# Patient Record
Sex: MALE | Race: OTHER | NOT HISPANIC OR LATINO | URBAN - METROPOLITAN AREA
[De-identification: names, ages, dates, MRNs, and addresses within clinical notes are randomized per-mention and may not be internally consistent; named-entity substitution may affect disease eponyms.]

---

## 2017-06-17 ENCOUNTER — INPATIENT (INPATIENT)
Facility: HOSPITAL | Age: 57
LOS: 1 days | Discharge: HOME | DRG: 203 | End: 2017-06-19
Attending: FAMILY MEDICINE | Admitting: HOSPITALIST
Payer: MEDICARE

## 2017-06-17 VITALS
RESPIRATION RATE: 14 BRPM | HEART RATE: 106 BPM | OXYGEN SATURATION: 95 % | HEIGHT: 66 IN | WEIGHT: 169.98 LBS | SYSTOLIC BLOOD PRESSURE: 135 MMHG | TEMPERATURE: 100 F | DIASTOLIC BLOOD PRESSURE: 81 MMHG

## 2017-06-17 DIAGNOSIS — Z90.6 ACQUIRED ABSENCE OF OTHER PARTS OF URINARY TRACT: Chronic | ICD-10-CM

## 2017-06-17 DIAGNOSIS — Z90.5 ACQUIRED ABSENCE OF KIDNEY: Chronic | ICD-10-CM

## 2017-06-17 DIAGNOSIS — Z90.49 ACQUIRED ABSENCE OF OTHER SPECIFIED PARTS OF DIGESTIVE TRACT: Chronic | ICD-10-CM

## 2017-06-17 LAB
BASOPHILS # BLD AUTO: 0.1 K/UL — SIGNIFICANT CHANGE UP (ref 0–0.2)
BASOPHILS NFR BLD AUTO: 1 % — SIGNIFICANT CHANGE UP (ref 0–2)
EOSINOPHIL # BLD AUTO: 0.2 K/UL — SIGNIFICANT CHANGE UP (ref 0–0.5)
EOSINOPHIL NFR BLD AUTO: 2.7 % — SIGNIFICANT CHANGE UP (ref 0–6)
HCT VFR BLD CALC: 42.6 % — SIGNIFICANT CHANGE UP (ref 39–50)
HGB BLD-MCNC: 15.5 G/DL — SIGNIFICANT CHANGE UP (ref 13–17)
INR BLD: 1.13 RATIO — SIGNIFICANT CHANGE UP (ref 0.88–1.16)
LACTATE SERPL-SCNC: 1.3 MMOL/L — SIGNIFICANT CHANGE UP (ref 0.7–2)
LYMPHOCYTES # BLD AUTO: 2.4 K/UL — SIGNIFICANT CHANGE UP (ref 1–3.3)
LYMPHOCYTES # BLD AUTO: 29.8 % — SIGNIFICANT CHANGE UP (ref 13–44)
MCHC RBC-ENTMCNC: 36.5 GM/DL — HIGH (ref 32–36)
MCHC RBC-ENTMCNC: 37.4 PG — HIGH (ref 27–34)
MCV RBC AUTO: 102.5 FL — HIGH (ref 80–100)
MONOCYTES # BLD AUTO: 1.1 K/UL — HIGH (ref 0–0.9)
MONOCYTES NFR BLD AUTO: 14 % — HIGH (ref 1–9)
NEUTROPHILS # BLD AUTO: 4.2 K/UL — SIGNIFICANT CHANGE UP (ref 1.8–7.4)
NEUTROPHILS NFR BLD AUTO: 52.4 % — SIGNIFICANT CHANGE UP (ref 43–77)
PLATELET # BLD AUTO: 171 K/UL — SIGNIFICANT CHANGE UP (ref 150–400)
PROTHROM AB SERPL-ACNC: 12.3 SEC — SIGNIFICANT CHANGE UP (ref 9.8–12.7)
RBC # BLD: 4.15 M/UL — LOW (ref 4.2–5.8)
RBC # FLD: 12.4 % — SIGNIFICANT CHANGE UP (ref 10.3–14.5)
WBC # BLD: 8.1 K/UL — SIGNIFICANT CHANGE UP (ref 3.8–10.5)
WBC # FLD AUTO: 8.1 K/UL — SIGNIFICANT CHANGE UP (ref 3.8–10.5)

## 2017-06-17 PROCEDURE — 71020: CPT | Mod: 26

## 2017-06-17 RX ORDER — ACETAMINOPHEN 500 MG
650 TABLET ORAL ONCE
Qty: 0 | Refills: 0 | Status: COMPLETED | OUTPATIENT
Start: 2017-06-17 | End: 2017-06-17

## 2017-06-17 RX ORDER — PIPERACILLIN AND TAZOBACTAM 4; .5 G/20ML; G/20ML
3.38 INJECTION, POWDER, LYOPHILIZED, FOR SOLUTION INTRAVENOUS ONCE
Qty: 0 | Refills: 0 | Status: COMPLETED | OUTPATIENT
Start: 2017-06-17 | End: 2017-06-17

## 2017-06-17 RX ORDER — IPRATROPIUM/ALBUTEROL SULFATE 18-103MCG
3 AEROSOL WITH ADAPTER (GRAM) INHALATION ONCE
Qty: 0 | Refills: 0 | Status: COMPLETED | OUTPATIENT
Start: 2017-06-17 | End: 2017-06-17

## 2017-06-17 RX ORDER — SODIUM CHLORIDE 9 MG/ML
1000 INJECTION INTRAMUSCULAR; INTRAVENOUS; SUBCUTANEOUS
Qty: 0 | Refills: 0 | Status: COMPLETED | OUTPATIENT
Start: 2017-06-17 | End: 2017-06-18

## 2017-06-17 RX ADMIN — SODIUM CHLORIDE 1000 MILLILITER(S): 9 INJECTION INTRAMUSCULAR; INTRAVENOUS; SUBCUTANEOUS at 23:15

## 2017-06-17 RX ADMIN — Medication 650 MILLIGRAM(S): at 23:25

## 2017-06-17 RX ADMIN — Medication 3 MILLILITER(S): at 23:20

## 2017-06-17 RX ADMIN — PIPERACILLIN AND TAZOBACTAM 200 GRAM(S): 4; .5 INJECTION, POWDER, LYOPHILIZED, FOR SOLUTION INTRAVENOUS at 23:15

## 2017-06-17 NOTE — CONSULT NOTE ADULT - SUBJECTIVE AND OBJECTIVE BOX
Patient seen and examined today Plan discussed/Questions answered  (with patient/ancillary staff/colleagues) Chart notes reviewd More detailed Pulm/Critical Care notes, assessment and plan  will be added later today as needed after reviewing further labs as they become available     Notable interval events reviewed    ROS/PE  . REVIEW OF SYMPTOMS    Able to give ROS  Yes   NO   RELIABLE YES NO   Weakness Yes   Chills No   Vision changes No  Lymph nodes No enlarged glands   Endocrine No unexplained hair loss No het or cold intolerance   Allergy No hives   Sore throat No   Coughing blood No  Headache No  Confusion YES  Chest pain No  Palpitations No   Pain abdomen NO   Shortness of breath YES  Vomiting NO  Pain neck No  Pain abdomen NO     PHYSICAL EXAM    HEENT Unremarkable PERRLA atraumatic  RESP Fair air entry EXP prolonged    Harsh breath sound Resp distres mild  CARDIAC S1 S2 No S3     NO JVD   Awake Alert and ORIENTED x THREE  ABDOMEN SOFT BS PRESENT NOT DISTENDED No hepatosplenomegaly  PEDAL EDEMA present No calf tenderness  NO rash GENERAL Not TOXIC looking  . Patient seen and examined today Plan discussed/Questions answered  (with patient/ancillary staff/colleagues) Chart notes reviewd More detailed Pulm/Critical Care notes, assessment and plan  will be added later today as needed after reviewing further labs as they become available     Notable interval events reviewed    ROS/PE  . REVIEW OF SYMPTOMS    Able to give ROS  Yes     RELIABLE YES   Weakness Yes   Chills No   Vision changes No  Lymph nodes No enlarged glands   Endocrine No unexplained hair loss No het or cold intolerance   Allergy No hives   Sore throat No   Coughing blood No  Headache No  Confusion YES  Chest pain No  Palpitations No   Pain abdomen NO   Shortness of breath YES  Vomiting NO  Pain neck No  Pain abdomen NO     PHYSICAL EXAM    HEENT Unremarkable PERRLA atraumatic  RESP Fair air entry EXP prolonged    Harsh breath sound Resp distres mild  CARDIAC S1 S2 No S3     NO JVD   Awake Alert and ORIENTED x THREE  ABDOMEN SOFT BS PRESENT NOT DISTENDED No hepatosplenomegaly  PEDAL EDEMA present No calf tenderness  NO rash GENERAL Not TOXIC looking    PATIENT DESCRIPTION  56 [place] in [place] 36 y HO exposure to TB as per dtr originally from [place] HIV positive since 2000 on Kaletra Combivir last CD4 1060 as per patient has never had bronch HO multiple serial cancers L kidney ca [place] [facility] [place] nephrectomy done 2011 Bladder CA 5/2015 sp ureteroileostomy 2015 rectal ca sp surgery [facility] 2016 with postsurgical complication of compartment syndrome l leg urgen surgery done same day as colon surgery  disabled  quit working 2012 admitted [facility] 6/17/2017 because of cough and dyspnea x 2 d pta No recent travel No sick contact  He went to  and was started on levaquin which has not helped him and he came to [facility] and was admitted and Pulm consulted   6/15/2017 W 8.1 Hb 15./5 Plt 171k  LA 1.3   RESP TRACT INFECTION IN HIV PATIENT   6/17/2017 PCP is a possibility (can present with normal CXR) but will dw ID before starting bactrim keeping in mind he has a single kidney which is cancerous  Agree with zosyn for now basic pneumonia moe including Strep pneum lgionella ordered   HIV   6/17 Suggest ID eval for ART   RESP   Monitor po   HO URETEROILEOSTOMY   6/17/2017 RO UTI   .

## 2017-06-17 NOTE — ED ADULT NURSE NOTE - PSH
Bladder absent  removed secondary to cancer  History of colon resection    History of nephrectomy  left

## 2017-06-17 NOTE — ED ADULT TRIAGE NOTE - CHIEF COMPLAINT QUOTE
"He has a fever and he is a cancer patient."  pt went to urgent care was diagnosed with a UTI and a cold, pt is not currently on chemotherapy, temp 99.7 in triage

## 2017-06-18 DIAGNOSIS — J18.9 PNEUMONIA, UNSPECIFIED ORGANISM: ICD-10-CM

## 2017-06-18 DIAGNOSIS — S68.119A COMPLETE TRAUMATIC METACARPOPHALANGEAL AMPUTATION OF UNSPECIFIED FINGER, INITIAL ENCOUNTER: Chronic | ICD-10-CM

## 2017-06-18 DIAGNOSIS — C64.9 MALIGNANT NEOPLASM OF UNSPECIFIED KIDNEY, EXCEPT RENAL PELVIS: ICD-10-CM

## 2017-06-18 DIAGNOSIS — Z21 ASYMPTOMATIC HUMAN IMMUNODEFICIENCY VIRUS [HIV] INFECTION STATUS: ICD-10-CM

## 2017-06-18 DIAGNOSIS — Z98.890 OTHER SPECIFIED POSTPROCEDURAL STATES: Chronic | ICD-10-CM

## 2017-06-18 DIAGNOSIS — Z29.9 ENCOUNTER FOR PROPHYLACTIC MEASURES, UNSPECIFIED: ICD-10-CM

## 2017-06-18 DIAGNOSIS — E78.00 PURE HYPERCHOLESTEROLEMIA, UNSPECIFIED: ICD-10-CM

## 2017-06-18 DIAGNOSIS — Z72.0 TOBACCO USE: ICD-10-CM

## 2017-06-18 DIAGNOSIS — J40 BRONCHITIS, NOT SPECIFIED AS ACUTE OR CHRONIC: ICD-10-CM

## 2017-06-18 DIAGNOSIS — E11.9 TYPE 2 DIABETES MELLITUS WITHOUT COMPLICATIONS: ICD-10-CM

## 2017-06-18 LAB
ALBUMIN SERPL ELPH-MCNC: 3.4 G/DL — SIGNIFICANT CHANGE UP (ref 3.3–5)
ALBUMIN SERPL ELPH-MCNC: 3.6 G/DL — SIGNIFICANT CHANGE UP (ref 3.3–5)
ALP SERPL-CCNC: 107 U/L — SIGNIFICANT CHANGE UP (ref 40–120)
ALP SERPL-CCNC: 114 U/L — SIGNIFICANT CHANGE UP (ref 40–120)
ALT FLD-CCNC: 11 U/L — LOW (ref 12–78)
ALT FLD-CCNC: 11 U/L — LOW (ref 12–78)
ANION GAP SERPL CALC-SCNC: 6 MMOL/L — SIGNIFICANT CHANGE UP (ref 5–17)
ANION GAP SERPL CALC-SCNC: 8 MMOL/L — SIGNIFICANT CHANGE UP (ref 5–17)
APPEARANCE UR: CLEAR — SIGNIFICANT CHANGE UP
AST SERPL-CCNC: 13 U/L — LOW (ref 15–37)
AST SERPL-CCNC: 15 U/L — SIGNIFICANT CHANGE UP (ref 15–37)
BACTERIA # UR AUTO: ABNORMAL
BASOPHILS # BLD AUTO: 0.1 K/UL — SIGNIFICANT CHANGE UP (ref 0–0.2)
BASOPHILS NFR BLD AUTO: 1.2 % — SIGNIFICANT CHANGE UP (ref 0–2)
BILIRUB SERPL-MCNC: 0.7 MG/DL — SIGNIFICANT CHANGE UP (ref 0.2–1.2)
BILIRUB SERPL-MCNC: 0.7 MG/DL — SIGNIFICANT CHANGE UP (ref 0.2–1.2)
BILIRUB UR-MCNC: NEGATIVE — SIGNIFICANT CHANGE UP
BUN SERPL-MCNC: 14 MG/DL — SIGNIFICANT CHANGE UP (ref 7–23)
BUN SERPL-MCNC: 17 MG/DL — SIGNIFICANT CHANGE UP (ref 7–23)
CALCIUM SERPL-MCNC: 9 MG/DL — SIGNIFICANT CHANGE UP (ref 8.5–10.1)
CALCIUM SERPL-MCNC: 9.3 MG/DL — SIGNIFICANT CHANGE UP (ref 8.5–10.1)
CHLORIDE SERPL-SCNC: 103 MMOL/L — SIGNIFICANT CHANGE UP (ref 96–108)
CHLORIDE SERPL-SCNC: 106 MMOL/L — SIGNIFICANT CHANGE UP (ref 96–108)
CO2 SERPL-SCNC: 25 MMOL/L — SIGNIFICANT CHANGE UP (ref 22–31)
CO2 SERPL-SCNC: 25 MMOL/L — SIGNIFICANT CHANGE UP (ref 22–31)
COLOR SPEC: YELLOW — SIGNIFICANT CHANGE UP
CREAT SERPL-MCNC: 1.1 MG/DL — SIGNIFICANT CHANGE UP (ref 0.5–1.3)
CREAT SERPL-MCNC: 1.2 MG/DL — SIGNIFICANT CHANGE UP (ref 0.5–1.3)
DIFF PNL FLD: ABNORMAL
EOSINOPHIL # BLD AUTO: 0.2 K/UL — SIGNIFICANT CHANGE UP (ref 0–0.5)
EOSINOPHIL NFR BLD AUTO: 3.3 % — SIGNIFICANT CHANGE UP (ref 0–6)
GLUCOSE SERPL-MCNC: 157 MG/DL — HIGH (ref 70–99)
GLUCOSE SERPL-MCNC: 209 MG/DL — HIGH (ref 70–99)
GLUCOSE UR QL: NEGATIVE — SIGNIFICANT CHANGE UP
GRAM STN FLD: SIGNIFICANT CHANGE UP
HBA1C BLD-MCNC: 6 % — HIGH (ref 4–5.6)
HCT VFR BLD CALC: 42.3 % — SIGNIFICANT CHANGE UP (ref 39–50)
HGB BLD-MCNC: 15 G/DL — SIGNIFICANT CHANGE UP (ref 13–17)
KETONES UR-MCNC: NEGATIVE — SIGNIFICANT CHANGE UP
LACTATE SERPL-SCNC: 1.3 MMOL/L — SIGNIFICANT CHANGE UP (ref 0.7–2)
LEUKOCYTE ESTERASE UR-ACNC: NEGATIVE — SIGNIFICANT CHANGE UP
LYMPHOCYTES # BLD AUTO: 1.7 K/UL — SIGNIFICANT CHANGE UP (ref 1–3.3)
LYMPHOCYTES # BLD AUTO: 26.7 % — SIGNIFICANT CHANGE UP (ref 13–44)
MCHC RBC-ENTMCNC: 35.4 GM/DL — SIGNIFICANT CHANGE UP (ref 32–36)
MCHC RBC-ENTMCNC: 37 PG — HIGH (ref 27–34)
MCV RBC AUTO: 104.3 FL — HIGH (ref 80–100)
MONOCYTES # BLD AUTO: 0.9 K/UL — SIGNIFICANT CHANGE UP (ref 0–0.9)
MONOCYTES NFR BLD AUTO: 14.8 % — HIGH (ref 1–9)
NEUTROPHILS # BLD AUTO: 3.4 K/UL — SIGNIFICANT CHANGE UP (ref 1.8–7.4)
NEUTROPHILS NFR BLD AUTO: 54.1 % — SIGNIFICANT CHANGE UP (ref 43–77)
NITRITE UR-MCNC: NEGATIVE — SIGNIFICANT CHANGE UP
PH UR: 7 — SIGNIFICANT CHANGE UP (ref 5–8)
PLATELET # BLD AUTO: 159 K/UL — SIGNIFICANT CHANGE UP (ref 150–400)
POTASSIUM SERPL-MCNC: 3.4 MMOL/L — LOW (ref 3.5–5.3)
POTASSIUM SERPL-MCNC: 3.8 MMOL/L — SIGNIFICANT CHANGE UP (ref 3.5–5.3)
POTASSIUM SERPL-SCNC: 3.4 MMOL/L — LOW (ref 3.5–5.3)
POTASSIUM SERPL-SCNC: 3.8 MMOL/L — SIGNIFICANT CHANGE UP (ref 3.5–5.3)
PROCALCITONIN SERPL-MCNC: 0.07 NG/ML — HIGH (ref 0–0.04)
PROT SERPL-MCNC: 6.9 G/DL — SIGNIFICANT CHANGE UP (ref 6–8.3)
PROT SERPL-MCNC: 7.6 G/DL — SIGNIFICANT CHANGE UP (ref 6–8.3)
PROT UR-MCNC: 25 MG/DL
RAPID RVP RESULT: SIGNIFICANT CHANGE UP
RBC # BLD: 4.05 M/UL — LOW (ref 4.2–5.8)
RBC # FLD: 12.3 % — SIGNIFICANT CHANGE UP (ref 10.3–14.5)
RBC CASTS # UR COMP ASSIST: ABNORMAL /HPF (ref 0–4)
SODIUM SERPL-SCNC: 136 MMOL/L — SIGNIFICANT CHANGE UP (ref 135–145)
SODIUM SERPL-SCNC: 137 MMOL/L — SIGNIFICANT CHANGE UP (ref 135–145)
SP GR SPEC: 1.01 — SIGNIFICANT CHANGE UP (ref 1.01–1.02)
SPECIMEN SOURCE: SIGNIFICANT CHANGE UP
UROBILINOGEN FLD QL: NEGATIVE — SIGNIFICANT CHANGE UP
WBC # BLD: 6.3 K/UL — SIGNIFICANT CHANGE UP (ref 3.8–10.5)
WBC # FLD AUTO: 6.3 K/UL — SIGNIFICANT CHANGE UP (ref 3.8–10.5)
WBC UR QL: ABNORMAL

## 2017-06-18 PROCEDURE — 99223 1ST HOSP IP/OBS HIGH 75: CPT | Mod: AI,GC

## 2017-06-18 PROCEDURE — 71250 CT THORAX DX C-: CPT | Mod: 26

## 2017-06-18 PROCEDURE — 93970 EXTREMITY STUDY: CPT | Mod: 26

## 2017-06-18 PROCEDURE — 99285 EMERGENCY DEPT VISIT HI MDM: CPT

## 2017-06-18 PROCEDURE — 12345: CPT | Mod: NC

## 2017-06-18 RX ORDER — GLUCAGON INJECTION, SOLUTION 0.5 MG/.1ML
1 INJECTION, SOLUTION SUBCUTANEOUS ONCE
Qty: 0 | Refills: 0 | Status: DISCONTINUED | OUTPATIENT
Start: 2017-06-18 | End: 2017-06-19

## 2017-06-18 RX ORDER — DEXTROSE 50 % IN WATER 50 %
12.5 SYRINGE (ML) INTRAVENOUS ONCE
Qty: 0 | Refills: 0 | Status: DISCONTINUED | OUTPATIENT
Start: 2017-06-18 | End: 2017-06-19

## 2017-06-18 RX ORDER — INSULIN LISPRO 100/ML
VIAL (ML) SUBCUTANEOUS
Qty: 0 | Refills: 0 | Status: DISCONTINUED | OUTPATIENT
Start: 2017-06-18 | End: 2017-06-19

## 2017-06-18 RX ORDER — DEXTROSE 50 % IN WATER 50 %
1 SYRINGE (ML) INTRAVENOUS ONCE
Qty: 0 | Refills: 0 | Status: DISCONTINUED | OUTPATIENT
Start: 2017-06-18 | End: 2017-06-19

## 2017-06-18 RX ORDER — DEXTROSE 50 % IN WATER 50 %
25 SYRINGE (ML) INTRAVENOUS ONCE
Qty: 0 | Refills: 0 | Status: DISCONTINUED | OUTPATIENT
Start: 2017-06-18 | End: 2017-06-19

## 2017-06-18 RX ORDER — PIPERACILLIN AND TAZOBACTAM 4; .5 G/20ML; G/20ML
3.38 INJECTION, POWDER, LYOPHILIZED, FOR SOLUTION INTRAVENOUS EVERY 8 HOURS
Qty: 0 | Refills: 0 | Status: DISCONTINUED | OUTPATIENT
Start: 2017-06-18 | End: 2017-06-19

## 2017-06-18 RX ORDER — NICOTINE POLACRILEX 2 MG
1 GUM BUCCAL DAILY
Qty: 0 | Refills: 0 | Status: DISCONTINUED | OUTPATIENT
Start: 2017-06-18 | End: 2017-06-19

## 2017-06-18 RX ORDER — ACETAMINOPHEN 500 MG
650 TABLET ORAL EVERY 6 HOURS
Qty: 0 | Refills: 0 | Status: DISCONTINUED | OUTPATIENT
Start: 2017-06-18 | End: 2017-06-19

## 2017-06-18 RX ORDER — ENOXAPARIN SODIUM 100 MG/ML
40 INJECTION SUBCUTANEOUS EVERY 24 HOURS
Qty: 0 | Refills: 0 | Status: DISCONTINUED | OUTPATIENT
Start: 2017-06-18 | End: 2017-06-19

## 2017-06-18 RX ORDER — SODIUM CHLORIDE 9 MG/ML
1000 INJECTION, SOLUTION INTRAVENOUS
Qty: 0 | Refills: 0 | Status: DISCONTINUED | OUTPATIENT
Start: 2017-06-18 | End: 2017-06-19

## 2017-06-18 RX ORDER — ONDANSETRON 8 MG/1
4 TABLET, FILM COATED ORAL EVERY 6 HOURS
Qty: 0 | Refills: 0 | Status: DISCONTINUED | OUTPATIENT
Start: 2017-06-18 | End: 2017-06-19

## 2017-06-18 RX ORDER — IPRATROPIUM/ALBUTEROL SULFATE 18-103MCG
3 AEROSOL WITH ADAPTER (GRAM) INHALATION
Qty: 0 | Refills: 0 | Status: DISCONTINUED | OUTPATIENT
Start: 2017-06-18 | End: 2017-06-19

## 2017-06-18 RX ORDER — ATORVASTATIN CALCIUM 80 MG/1
40 TABLET, FILM COATED ORAL AT BEDTIME
Qty: 0 | Refills: 0 | Status: DISCONTINUED | OUTPATIENT
Start: 2017-06-18 | End: 2017-06-19

## 2017-06-18 RX ORDER — ENOXAPARIN SODIUM 100 MG/ML
30 INJECTION SUBCUTANEOUS EVERY 24 HOURS
Qty: 0 | Refills: 0 | Status: DISCONTINUED | OUTPATIENT
Start: 2017-06-18 | End: 2017-06-18

## 2017-06-18 RX ORDER — POTASSIUM CHLORIDE 20 MEQ
40 PACKET (EA) ORAL ONCE
Qty: 0 | Refills: 0 | Status: COMPLETED | OUTPATIENT
Start: 2017-06-18 | End: 2017-06-18

## 2017-06-18 RX ADMIN — Medication 2 TABLET(S): at 12:50

## 2017-06-18 RX ADMIN — Medication 3 MILLILITER(S): at 11:37

## 2017-06-18 RX ADMIN — Medication 2 TABLET(S): at 22:10

## 2017-06-18 RX ADMIN — PIPERACILLIN AND TAZOBACTAM 25 GRAM(S): 4; .5 INJECTION, POWDER, LYOPHILIZED, FOR SOLUTION INTRAVENOUS at 09:21

## 2017-06-18 RX ADMIN — SODIUM CHLORIDE 1000 MILLILITER(S): 9 INJECTION INTRAMUSCULAR; INTRAVENOUS; SUBCUTANEOUS at 00:30

## 2017-06-18 RX ADMIN — ATORVASTATIN CALCIUM 40 MILLIGRAM(S): 80 TABLET, FILM COATED ORAL at 21:52

## 2017-06-18 RX ADMIN — Medication 3 MILLILITER(S): at 19:50

## 2017-06-18 RX ADMIN — Medication 3 MILLILITER(S): at 08:07

## 2017-06-18 RX ADMIN — PIPERACILLIN AND TAZOBACTAM 25 GRAM(S): 4; .5 INJECTION, POWDER, LYOPHILIZED, FOR SOLUTION INTRAVENOUS at 23:25

## 2017-06-18 RX ADMIN — Medication 40 MILLIEQUIVALENT(S): at 12:48

## 2017-06-18 RX ADMIN — Medication 1 PATCH: at 12:49

## 2017-06-18 RX ADMIN — Medication 3 MILLILITER(S): at 15:12

## 2017-06-18 RX ADMIN — PIPERACILLIN AND TAZOBACTAM 25 GRAM(S): 4; .5 INJECTION, POWDER, LYOPHILIZED, FOR SOLUTION INTRAVENOUS at 17:51

## 2017-06-18 RX ADMIN — Medication 1: at 17:51

## 2017-06-18 NOTE — ED ADULT NURSE REASSESSMENT NOTE - COMFORT CARE
repositioned/side rails up
warm blanket provided/darkened lights/repositioned/plan of care explained/wait time explained

## 2017-06-18 NOTE — H&P ADULT - ATTENDING COMMENTS
55 yo M with PMHx of colon CA s/p resection (May 2016), Bladder CA s/p resection with urostomy (2015), Left sided nephrectomy due to tumor (2012), ?DM2 (not on medications), HLD, and HIV being admitted for possible Pneumonia SIRS/SEPSIS, vs PCP vs Legionalla.  Since pt is immunocompromised 2/2 HIV, ID consulted.  Pt's last CD4 was 1060.  Zosyn for now.  Evaluate for PCP vs Legionalla vs Strep pneumo.  Monitor labs AM.  Pulmonary consult appreciated.  F/U CT and Dopplers

## 2017-06-18 NOTE — H&P ADULT - ASSESSMENT
57 yo M with PMHx of colon CA s/p resection (May 2016), Bladder CA s/p resection with urostomy (2015), Left sided nephrectomy due to tumor (2012), ?DM2 (not on medications), HLD, and HIV presented to the ED with fever, chills, coughing, and nausea x 3 days admitted for 55 yo M with PMHx of colon CA s/p resection (May 2016), Bladder CA s/p resection with urostomy (2015), Left sided nephrectomy due to tumor (2012), ?DM2 (not on medications), HLD, and HIV presented to the ED with fever, chills, coughing, and nausea x 3 days admitted for Pneumonia, vs PCP vs Legionalla

## 2017-06-18 NOTE — PROGRESS NOTE ADULT - PROBLEM SELECTOR PLAN 1
Unclear source of cough/fever.  OIs not likely considering patient's self-reported CD4 count >1,000.  Pulm Dr. Hoskins following  Continue empiric IV Zosyn  Start Duoneb  Start Tylenol PRN for fever  Will check RVP to rule out viral causes.  Follow up AM labs, Legionella, sputum Cx, Blood Cx, UA, UCx, strep pneumo

## 2017-06-18 NOTE — H&P ADULT - PROBLEM SELECTOR PLAN 1
Admit to general medical floor  R/O PCP vs Legionella  Consult pulmonology- Dr. Hoskins  Continue IV Zosyn  Start Duoneb  Start Tylenol PRN for fever  Follow up AM labs, Legionella, procalcitonin, sputum Cx, Blood Cx, UA, UCx, strep pneumo  Follow up CT chest without contrast Admit to general medical floor  evaluate for PCP vs Legionella  Consult pulmonology- Dr. Hoskins  Continue IV Zosyn  Start Duoneb  Start Tylenol PRN for fever  Follow up AM labs, Legionella, procalcitonin, sputum Cx, Blood Cx, UA, UCx, strep pneumo  Follow up CT chest without contrast

## 2017-06-18 NOTE — ED PROVIDER NOTE - OBJECTIVE STATEMENT
56 male presents to ER with daughter, c/o fever tmax 101 F, cough, productive of sputum 56 male presents to ER with daughter, c/o fever tmax 101 F, cough, productive of sputum, shortness of breath with exertion, generalized weakness for the past 3 days, went to urgent care was prescribed levaquin, not improving, having nausea and upset stomach and no improvement in symptoms.

## 2017-06-18 NOTE — H&P ADULT - PSH
Bladder absent  removed secondary to cancer  Finger amputation, no complication  right hand digits 3-5  H/O fasciotomy  LLE  History of colon resection    History of nephrectomy  left

## 2017-06-18 NOTE — H&P ADULT - HISTORY OF PRESENT ILLNESS
57 yo M with PMHx of colon CA s/p resection (May 2016), Bladder CA s/p resection with urostomy (2015), Left sided nephrectomy due to tumor (2012), ?DM2 (not on medications), HLD, and HIV presented to the ED with fever, chills, coughing, and nausea x 3 days. The patient states that his symptoms began with a productive cough with yellow and bloody sputum. Tmax 101 at home. The patient went to an urgent care 2 days prior to admission. He received Levaquin 750 qd and has taken two doses. The symptoms did not improve which brought him to the ED. The patient admits to headache, fever, chills, body aches, nausea and cough. He also admits to occasional sharp right inguinal pain s/p bladder resection. Denies chest pain, SOB, abdominal pain, diarrhea, constipation, hematuria, melena. The patient has an extensive cancer history which is being followed up at [facility]. Each of his cancers were different primary tumors. The patient states that he has been told he was a diabetic but refuses to take medications. Current daily smoker, 1 pack per day    In the ED, /81, , T 99.7, RR 14, SPO2 95% on RA  WBC 8.1, Hgb 15.5, Hct 42.6, Platelets 171, PT 12.3, INR 1.13, Lactate 1.3, Glucose 157  EKG: sinus tachycardia,  (prelim read)  CXR pending read  CT chest w/o contrast, and B/L LE Doppler

## 2017-06-18 NOTE — PROGRESS NOTE ADULT - PROBLEM SELECTOR PLAN 3
Patient is non compliant with medications  Follow up AM labs, HbA1C  HISS  Hypoglycemia protocol  Monitor accuchecks  Diabetic diet

## 2017-06-18 NOTE — H&P ADULT - NSHPSOCIALHISTORY_GEN_ALL_CORE
Social History:    Marital Status:  ( X  )    (   ) Single    (   )    (  )   Occupation:   Lives with: (  ) alone  ( X ) children   ( X ) spouse   (  ) parents  (  ) other    Substance Use (street drugs): ( X ) never used  (  ) other:  Tobacco Usage:  (   ) never smoked   (   ) former smoker   ( X  ) current smoker; 1ppd for 50 years. Started at 6 years old  Alcohol Usage: None    Health Management    For male:  Last prostate exam: (   X  ) No  (    ) Yes; s/p resection with bladder    Immunization Hx:   ( X ) flu shot                               (     ) date   ( X ) pneumonia shot               (     ) date  ( X ) tetanus                               (     ) date

## 2017-06-18 NOTE — H&P ADULT - NSHPPHYSICALEXAM_GEN_ALL_CORE
PHYSICAL EXAM:  GENERAL: NAD, well-groomed, well-developed  HEAD:  Atraumatic, Normocephalic  EYES: EOMI, PERRLA, conjunctiva and sclera clear  ENMT: No tonsillar erythema, exudates, or enlargement  NECK: Normal thyroid  NERVOUS SYSTEM:  Alert & Oriented X3, Good concentration  CHEST/LUNG: Clear to auscultation bilaterally; No rales, rhonchi, wheezing, or rubs; + cough with yellow sputum  HEART: tachycardic; No murmurs, rubs, or gallops  ABDOMEN: Soft, Nontender, Nondistended; Bowel sounds present; +urostomy draining clear urine  EXTREMITIES:  2+ Peripheral Pulses, No clubbing, cyanosis, or edema, no tenderness to palpation of bilateral lower extremities PHYSICAL EXAM:  GENERAL: NAD, well-groomed, well-developed  HEAD:  Atraumatic, Normocephalic  EYES: EOMI, PERRLA, conjunctiva and sclera clear  ENMT: No tonsillar erythema, exudates, or enlargement  NECK: Normal thyroid  NERVOUS SYSTEM:  Alert & Oriented X3, Good concentration  CHEST/LUNG: Clear to auscultation bilaterally; No rales, rhonchi, wheezing, or rubs; + cough with yellow sputum  HEART: tachycardic; No murmurs, rubs, or gallops  ABDOMEN: Soft, Nontender, Nondistended; Bowel sounds present; +urostomy draining clear urine  EXTREMITIES:  2+ Peripheral Pulses, No clubbing, cyanosis, or edema, no tenderness to palpation of bilateral lower extremities  Psych - normal behavior

## 2017-06-18 NOTE — ED PROVIDER NOTE - MEDICAL DECISION MAKING DETAILS
56 male with fever r/o pneumonia, already on levaquin, f/u sepsis labs, chest xray, ekg, start broad spectrum antibiotics, pulmonary consult

## 2017-06-18 NOTE — CONSULT NOTE ADULT - SUBJECTIVE AND OBJECTIVE BOX
Infectious Diseases Consult by Stef Martinez MD    Reason for Consult : Febrile syndrome r/o sepsis    HPI:  55 yo M with PMHx of colon CA s/p resection (May 2016), Bladder CA s/p resection with urostomy (), Left sided nephrectomy due to tumor (), ?DM2 (not on medications), HLD, and HIV presented to the ED with fever, chills, coughing, and nausea x 3 days. The patient states that his symptoms began with a productive cough with yellow and bloody sputum. Tmax 101 at home. The patient went to an urgent care 2 days prior to admission. He received Levaquin 750 qd and has taken two doses. The symptoms did not improve which brought him to the ED. The patient admits to headache, fever, chills, body aches, nausea and cough. He also admits to occasional sharp right inguinal pain s/p bladder resection. Denies chest pain, SOB, abdominal pain, diarrhea, constipation, hematuria, melena. The patient has an extensive cancer history which is being followed up at [facility]. Each of his cancers were different primary tumors. The patient states that he has been told he was a diabetic but refuses to take medications. Current daily smoker, 1 pack per day. He follows with his oncologist at [facility] , last Ct scan was over 1 year ago . He denies any chest pain, weight loss or night sweats. No hx of recent travel or sick contacts .    In the ED, /81, , T 99.7, RR 14, SPO2 95% on RA  WBC 8.1, Hgb 15.5, Hct 42.6, Platelets 171, PT 12.3, INR 1.13, Lactate 1.3, Glucose 157  EKG: sinus tachycardia,  (prelim read)    Past Medical & Surgical Hx:  PAST MEDICAL & SURGICAL HISTORY:  Diabetes mellitus  High cholesterol  Kidney carcinoma  Bladder cancer  Colon cancer  Finger amputation, no complication: right hand digits 3-5  H/O fasciotomy: LLE  Bladder absent: removed secondary to cancer  History of nephrectomy: left  History of colon resection      Social History-- Disabled   EtOH: denies   Tobacco: Active smoker over 1ppd   Drug Use: denies     FAMILY HISTORY:  Family history of diabetes mellitus (Mother)      Allergies    No Known Allergies    Intolerances    Home/ Out patient  Medications :  * Patient Currently Takes Medications as of 2017 01:07 documented in Prescription Writer  · 	Lipitor:  orally , Last Dose Taken:    · 	levoFLOXacin 750 mg oral tablet: 1 tab(s) orally every 24 hours, Last Dose Taken:    · 	Kaletra 200 mg-50 mg oral tablet: 2 tab(s) orally 2 times a day, Last Dose Taken:    · 	lamiVUDine-zidovudine 150 mg-300 mg oral tablet: 1 tab(s) orally 2 times a day, Last Dose Taken:        Current Inpatient Medications :    ANTIBIOTICS:   piperacillin/tazobactam IVPB. 3.375Gram(s) IV Intermittent every 8 hours  zidovudine 300 mG/lamiVUDine 150 mG 1Tablet(s) Oral two times a day  lopinavir 200 mG/ritonavir 50 mG 2Tablet(s) Oral two times a day      OTHER RELEVANT MEDICATIONS :  ondansetron Injectable 4milliGRAM(s) IV Push every 6 hours PRN  ALBUTerol/ipratropium for Nebulization 3milliLiter(s) Nebulizer four times a day  enoxaparin Injectable 40milliGRAM(s) SubCutaneous every 24 hours  insulin lispro (HumaLOG) corrective regimen sliding scale  SubCutaneous three times a day before meals  dextrose 5%. 1000milliLiter(s) IV Continuous <Continuous>  dextrose Gel 1Dose(s) Oral once PRN  dextrose 50% Injectable 12.5Gram(s) IV Push once  dextrose 50% Injectable 25Gram(s) IV Push once  atorvastatin 40milliGRAM(s) Oral at bedtime  acetaminophen   Tablet 650milliGRAM(s) Oral every 6 hours PRN  dextrose 50% Injectable 25Gram(s) IV Push once  glucagon  Injectable 1milliGRAM(s) IntraMuscular once PRN  potassium chloride    Tablet ER 40milliEquivalent(s) Oral once      ROS:  CONSTITUTIONAL:  Positive  fever and chills, feels weak, good appetite  EYES:  Negative  blurry vision or double vision  CARDIOVASCULAR:  Negative for chest pain or palpitations  RESPIRATORY:  Positive  for cough, NO wheezing, or SOB   GASTROINTESTINAL:  Negative for nausea, vomiting, diarrhea, constipation, or abdominal pain  GENITOURINARY:  Negative frequency, urgency , dysuria or hematuria   NEUROLOGIC:  No headache, confusion, dizziness, lightheadedness  All other systems were reviewed and are negative    I&O's Detail    I & Os for current day (as of 2017 11:56)  =============================================  IN:    Oral Fluid: 200 ml    Total IN: 200 ml  ---------------------------------------------  OUT:    Total OUT: 0 ml  ---------------------------------------------  Total NET: 200 ml      Physical Exam:  Vital Signs Last 24 Hrs  T(C): 37.6, Max: 39.7 ( @ 23:00)  T(F): 99.7, Max: 103.5 ( @ 23:00)  HR: 89 (89 - 106)  BP: 137/70 (123/73 - 140/84)  BP(mean): --  RR: 16 (14 - 16)  SpO2: 96% (95% - 96%)  Height (cm): 167.6 ( @ 22:25)  Weight (kg): 77.1 ( @ 22:25)  BMI (kg/m2): 27.4 ( @ 22:25)  BSA (m2): 1.87 ( @ 22:25)  GEN: NAD, pleasant  HEENT: normocephalic and atraumatic. EOMI. AILEEN. Conjunctival  & sclera clear,  Moist mucosa. Clear Posterior pharynx. No oral thrush   NECK: Supple. No carotid bruits.  No lymphadenopathy or thyromegaly.  LUNGS: Clear to auscultation.  HEART: Regular rate and rhythm without murmur.  ABDOMEN: Soft, nontender, and nondistended.  Positive bowel sounds.  No hepatosplenomegaly was noted.  EXTREMITIES: Without any cyanosis, clubbing, rash, lesions or edema. Peripheral pulses  2 +  NEUROLOGIC: A & O x 3 , No focal neurological deficits   SKIN: No ulceration or induration present.      Labs:         137  |  106  |  14  ----------------------------<  209<H>  3.4<L>   |  25  |  1.10    Ca    9.0      2017 05:46    TPro  6.9  /  Alb  3.4  /  TBili  0.7  /  DBili  x   /  AST  13<L>  /  ALT  11<L>  /  AlkPhos  107  -                          15.0   6.3   )-----------( 159      ( 2017 05:46 )             42.3     Procalcitonin, Serum (17 @ 23:30)    Procalcitonin, Serum: 0.07:     Lactate, Blood (17 @ 05:46)    Lactate, Blood: 1.3 mmol/L      PT/INR - ( 2017 23:30 )   PT: 12.3 sec;   INR: 1.13 ratio           Urinalysis Basic - ( 2017 01:51 )    Color: Yellow / Appearance: Clear / S.010 / pH: x  Gluc: x / Ketone: Negative  / Bili: Negative / Urobili: Negative   Blood: x / Protein: 25 mg/dL / Nitrite: Negative   Leuk Esterase: Negative / RBC: 3-5 /HPF / WBC 11-25   Sq Epi: x / Non Sq Epi: x / Bacteria: Few      LIVER FUNCTIONS - ( 2017 05:46 )  Alb: 3.4 g/dL / Pro: 6.9 g/dL / ALK PHOS: 107 U/L / ALT: 11 U/L / AST: 13 U/L / GGT: x               CAPILLARY BLOOD GLUCOSE  145 (2017 07:48)  171 (2017 02:31)      Culture Data: pending     RADIOLOGY & ADDITIONAL STUDIES:      EXAM:  CT CHEST                          PROCEDURE DATE:  2017        INTERPRETATION:  CLINICAL INFORMATION:  Respiratory tract infection, HIV.    PROCEDURE:  Using multislice helical CT, 2.5 mm sections were obtained   from the thoracic inlet through the lung bases.  Multiplanar reformatted images.    COMPARISON: Chest radiograph 2017.  FINDINGS:      There is biapical scarring.    There is peribronchovascular thickening and groundglass opacification in   the left perihilar lingula lobe.  In addition, there are scattered, poorly defined centrilobular nodular   opacities, for example superior segment right lower lobe.   These findings may represent infectious/inflammatory airway disease.  In addition, there are several small scattered pulmonary nodules,   predominantly right upper lobe, the largest peripherally, measuring 4 mm   in the posterior segment right upper lobe, image 47, series 3.    The central airways remain patent. No endobronchial lesion is noted.    No pleural effusion is noted.    There are subcentimeter clusters of prevascular, pretracheal and right   paratracheal mediastinal lymph nodes.  Subcarinal mediastinal lymph nodes measure up to 14 mm in short axis.  There are probable small left hilar lymph nodes, evaluation is limited   without intravenous contrast.    There is arteriosclerotic calcification of the thoracic aorta, there is   ectasia of the ascending aorta measuring up to 3.9 cm.  Coronary artery calcification is noted.    There are multiple indeterminate hypodense lesions within the spleen.   There are several coarse calcifications within the spleen.  There are small portacaval lymph nodes.    Left retroperitoneal surgical clips are present.    There are degenerative changes at the mid to lower thoraci    Impression:     CT findings as discussed which may represent infectious or inflammatory   small airway disease.  Small, nonspecific pulmonary nodules.    Recommend short interval follow-up chest CT examination in 10-12 weeks.    Other findings as discussed above.    Assessment :     55 yo M with PMHx of colon CA s/p resection (May 2016), Bladder CA s/p resection with urostomy (), Left sided nephrectomy due to tumor (), ?DM2 (not on medications), HLD, and HIV on ARV presented to the ED with fever, chills, coughing, and nausea x 3 days. His exam is non focal . Ct scan findings show pulmonary nodules could be inflammatory or metastatic disease.    Most likely he has a Viral syndrome with bronchitis     Plan :   Will continue with IV antibiotics pending cs results and RVP results .  Will check CD4 counts   will need to get in touch with [facility] to see what did ct chest results were and if these lung nodules were present   he is advised to stop smoking, a smoking cessation counselling was give to him  but he is not motivated to  quit smoking     Continue with present regime .  Appropriate use of antibiotics and adverse effects reviewed.      I have discussed the above plan of care with patient in detail. He  expressed understanding of the treatment plan . Risks, benefits and alternatives discussed in detail. I have asked if he has any questions or concerns and appropriately addressed them to the best of my ability .      > 45 minutes spent in direct patient care reviewing  the notes, lab data/ imaging , discussion with multidisciplinary team. All questions were addressed and answered to the best of my capacity .    Thank you for allowing me to participate in the care of your patient .      Stef Martinez MD  [phone]

## 2017-06-18 NOTE — PATIENT PROFILE ADULT. - FALL HARM RISK
other/coagulation(Bleeding disorder R/T clinical cond/anti-coags)/bones(Osteoporosis,prev fx,steroid use,metastatic bone ca

## 2017-06-18 NOTE — PROGRESS NOTE ADULT - SUBJECTIVE AND OBJECTIVE BOX
57 yo M with PMHx of colon CA s/p resection (May 2016), Bladder CA s/p resection with urostomy (2015), Left sided nephrectomy due to tumor (2012), ?DM2 (not on medications), HLD, and HIV presented to the ED with fever, chills, coughing, and nausea x 3 days. Patient feels slightly better today. Was febrile overnight. Complains of persistent productive cough with yellow sputum. Denies chest pain or SOB at present.      REVIEW OF SYSTEMS:    CONSTITUTIONAL: +fevers and chills, diaphoresis  EYES/ENT: No visual changes, no throat pain   RESPIRATORY: +cough, No wheezing, hemoptysis; No shortness of breath  CARDIOVASCULAR: No chest pain or palpitations  GASTROINTESTINAL: No abdominal, nausea, vomiting, or hematemesis; No diarrhea or constipation. No melena or hematochezia.  GENITOURINARY: No dysuria, frequency or hematuria  NEUROLOGICAL: No dizziness, numbness, or weakness  SKIN: No itching, burning, rashes, or lesions   All other review of systems is negative unless indicated above.    VITAL SIGNS:  Vital Signs Last 24 Hrs  T(C): 37.6, Max: 39.7 (06-17 @ 23:00)  T(F): 99.7, Max: 103.5 (06-17 @ 23:00)  HR: 89 (89 - 106)  BP: 137/70 (123/73 - 140/84)  BP(mean): --  RR: 16 (14 - 16)  SpO2: 96% (95% - 96%)      PHYSICAL EXAM:     GENERAL: no acute distress  HEENT: NC/AT, EOMI, neck supple, MMM  RESPIRATORY: coarse breath sounds bilaterally, +rhonchi. No wheeze  CARDIOVASCULAR: RRR, no murmurs, gallops, rubs  ABDOMINAL: soft, non-tender, non-distended, positive bowel sounds   EXTREMITIES: no clubbing, cyanosis, or edema  NEUROLOGICAL: alert and oriented x 3, non-focal  SKIN: no rashes or lesions   MUSCULOSKELETAL: no gross joint deformity                          15.0   6.3   )-----------( 159      ( 18 Jun 2017 05:46 )             42.3     06-18    137  |  106  |  14  ----------------------------<  209<H>  3.4<L>   |  25  |  1.10    Ca    9.0      18 Jun 2017 05:46    TPro  6.9  /  Alb  3.4  /  TBili  0.7  /  DBili  x   /  AST  13<L>  /  ALT  11<L>  /  AlkPhos  107  06-18      CAPILLARY BLOOD GLUCOSE  145 (18 Jun 2017 07:48)  171 (18 Jun 2017 02:31)      MEDICATIONS  (STANDING):  ALBUTerol/ipratropium for Nebulization 3milliLiter(s) Nebulizer four times a day  piperacillin/tazobactam IVPB. 3.375Gram(s) IV Intermittent every 8 hours  enoxaparin Injectable 40milliGRAM(s) SubCutaneous every 24 hours  insulin lispro (HumaLOG) corrective regimen sliding scale  SubCutaneous three times a day before meals  dextrose 5%. 1000milliLiter(s) IV Continuous <Continuous>  dextrose 50% Injectable 12.5Gram(s) IV Push once  dextrose 50% Injectable 25Gram(s) IV Push once  atorvastatin 40milliGRAM(s) Oral at bedtime  zidovudine 300 mG/lamiVUDine 150 mG 1Tablet(s) Oral two times a day  dextrose 50% Injectable 25Gram(s) IV Push once  nicotine - 21 mG/24Hr(s) Patch 1patch Transdermal daily  potassium chloride    Tablet ER 40milliEquivalent(s) Oral once  lopinavir 200 mG/ritonavir 50 mG 2Tablet(s) Oral two times a day

## 2017-06-18 NOTE — PROGRESS NOTE ADULT - ASSESSMENT
57 yo M with PMHx of colon CA s/p resection (May 2016), Bladder CA s/p resection with urostomy (2015), Left sided nephrectomy due to tumor (2012), ?DM2 (not on medications), HLD, and HIV presented to the ED with fever, chills, coughing, and nausea x 3 days admitted for pneumonia.

## 2017-06-18 NOTE — ED ADULT NURSE REASSESSMENT NOTE - GENERAL PATIENT STATE
improvement verbalized/comfortable appearance/smiling/interactive/cooperative/family/SO at bedside
cooperative/improvement verbalized/family/SO at bedside/resting/sleeping/comfortable appearance

## 2017-06-18 NOTE — H&P ADULT - PROBLEM SELECTOR PLAN 3
Patient is non compliant with medications  Follow up AM labs, HbA1C  Start HISS  Hypoglycemia protocol  Monitor accuchecks  Start diabetic diet

## 2017-06-18 NOTE — PROGRESS NOTE ADULT - SUBJECTIVE AND OBJECTIVE BOX
Patient seen and examined today Plan discussed/Questions answered  (with patient/ancillary staff/colleagues) Chart notes reviewd More detailed Pulm/Critical Care notes, assessment and plan  will be added later today as needed after reviewing further labs as they become available     Notable interval events reviewed Multifocal nodules on CT Chest in patient with HO recurrent cancer makes one suspect metastatic disease Will dw ID re bronch and bal v open biopsy     ROS/PE  . REVIEW OF SYMPTOMS    Able to give ROS  Yes     RELIABLE YES   Weakness Yes   Chills No   Vision changes No  Lymph nodes No enlarged glands   Endocrine No unexplained hair loss No het or cold intolerance   Allergy No hives   Sore throat No   Coughing blood No  Headache No  Confusion YES  Chest pain No  Palpitations No   Pain abdomen NO   Shortness of breath YES  Vomiting NO  Pain neck No  Pain abdomen NO     PHYSICAL EXAM    HEENT Unremarkable PERRLA atraumatic  RESP Fair air entry EXP prolonged    Harsh breath sound Resp distres mild  CARDIAC S1 S2 No S3     NO JVD   Awake Alert and ORIENTED x THREE  ABDOMEN SOFT BS PRESENT NOT DISTENDED No hepatosplenomegaly  PEDAL EDEMA present No calf tenderness  NO rash GENERAL Not TOXIC looking  . Patient seen and examined today Plan discussed/Questions answered  (with patient/ancillary staff/colleagues) Chart notes reviewd More detailed Pulm/Critical Care notes, assessment and plan  will be added later today as needed after reviewing further labs as they become available     Notable interval events reviewed Multifocal nodules on CT Chest in patient with HO recurrent cancer makes one suspect metastatic disease Will dw ID re bronch and bal v open biopsy     ROS/PE  . REVIEW OF SYMPTOMS    Able to give ROS  Yes     RELIABLE YES   Weakness Yes   Chills No   Vision changes No  Lymph nodes No enlarged glands   Endocrine No unexplained hair loss No het or cold intolerance   Allergy No hives   Sore throat No   Coughing blood No  Headache No  Confusion YES  Chest pain No  Palpitations No   Pain abdomen NO   Shortness of breath YES  Vomiting NO  Pain neck No  Pain abdomen NO     PHYSICAL EXAM    HEENT Unremarkable PERRLA atraumatic  RESP Fair air entry EXP prolonged    Harsh breath sound Resp distres mild  CARDIAC S1 S2 No S3     NO JVD   Awake Alert and ORIENTED x THREE  ABDOMEN SOFT BS PRESENT NOT DISTENDED No hepatosplenomegaly  PEDAL EDEMA present No calf tenderness  NO rash GENERAL Not TOXIC looking  . PATIENT DESCRIPTION  56 m in [place] 36 y HO exposure to TB as per dtr HO INH chemoprophylaxis in past originally from [place] HIV positive since 2000 on Kaletra Combivir last CD4 1060 as per patient has never had bronch HO multiple serial cancers L kidney ca 2011 [facility] L nephrectomy done 2011 Bladder CA 5/2015 sp ureteroileostomy 2015 rectal ca sp surgery [facility] 2016 with postsurgical complication of compartment syndrome l leg urgen surgery done same day as colon surgery  disabled  quit working 2012 admitted [facility] 6/17/2017 because of cough and dyspnea x 2 d pta No recent travel No sick contact  He went to  and was started on levaquin which has not helped him and he came to [facility] and was admitted and Pulm consulted   VITALS/LABS  6/18/2017 afeb 90 120/70 16 97%   6/18 acc 145-130   6/18/2017 W 6.3 Hb 15 Plt 159 Na 137 K 3.4 CO2 25 Cr 1.1 G 209   PROBLEM ASSESSMENT PLAN  RESP TRACT INFECTION IN HIV PATIENT   6/18 RVP n 6/18 sp c few gnr gp cocci clusters   6/18 pct .07   On zosyn (6/17)   LUNG NODULES   1) Biapical scar 2) peribronchovasc  thickening and ggo l perihilar lingula 3) scattered poorly defined centrilobular nodular opacities eg sup rll 4) scattered subcm nodules tonya rul largest peripheral 4 mm post rul   6/18 Discussed with ID Feels we can hold off bronch for now   6/18 Pt may eventually need VATS bx    RO DVT  6/18 V duplex legs n   HIV   Lopinavir 200/ritonavir 50 (KALETRA 200 50) 2.2 (6/18) zidovudine 300 lamivudine 150 COMBIVIR 1.2 (6/18)   RESP   Monitor po   COPD   On duoneb.4 (6/18)   HO URETEROILEOSTOMY   6/17/2017 RO UTI   SMOKER   nicotine 21 (6/18)   GLOBAL ISSUE/BEST PRACTICE:        PROBLEM:      Analgesia:     na                        PROBLEM: Sedation:     na               PROBLEM: HOB elevation:   yes             PROBLEM: Stress ulcer proph:    na                      PROBLEM: VTE prophylaxis:      lvnx 40 (6/18)                        PROBLEM: Glycemic control:    iss (6/18)             PROBLEM: Nutrition:    renal cons carb (6/18)                       PROBLEM: Advanced directive: na     PROBLEM: Allergies:  na  TIME SPENT Over 25 minutes aggregate time spent on encounter; activities included   direct patient care, counseling and/or coordinating care reviewing notes, lab data/ imaging , discussion with multidisciplinary team/ patient  /family. Risks, benefits, alternatives  discussed in detail. Questions/concerns  were addressed  to the best of my ability .

## 2017-06-18 NOTE — ED PROVIDER NOTE - GASTROINTESTINAL, MLM
Abdomen soft, non-tender, no guarding, ileoconduit bag in place with pink stoma and yellow urine in bag

## 2017-06-18 NOTE — ED ADULT NURSE REASSESSMENT NOTE - NS ED NURSE REASSESS COMMENT FT1
Report called to Andrew ROSSI. UA and C&S sent to lab. Pt on way to radiology for US lower extremities than pt will go to room. 107. Pt denies c/o at present time. Pt's daughter with pt going to US than to pt's room.

## 2017-06-18 NOTE — H&P ADULT - PROBLEM SELECTOR PLAN 2
Continue home medications Kaletra and Combivir  Consult ID- Dr. Eddie Martinez  Follow up recommendations

## 2017-06-18 NOTE — H&P ADULT - NSHPREVIEWOFSYSTEMS_GEN_ALL_CORE
REVIEW OF SYSTEMS:  CONSTITUTIONAL: Denies weight loss, fatigue; Admits to fever, chills  ENMT:  Admits to throat pain  RESPIRATORY: Admits to productive cough, chills, hemoptysis, pleuritic chest pain; Denies wheezing and SOB   CARDIOVASCULAR: No chest pain, palpitations, or leg swelling  GASTROINTESTINAL: No abdominal or epigastric pain. No diarrhea or constipation. No melena or hematochezia. Admits to nausea without vomiting  GENITOURINARY: Urostomy, occasional sharp right inguinal pain, no hematuria  NEUROLOGICAL: Admits to headaches, Denies memory loss, loss of strength, numbness, or tremors  MUSCULOSKELETAL: No joint pain or swelling; B/L lower extremity burning

## 2017-06-19 VITALS — OXYGEN SATURATION: 97 %

## 2017-06-19 LAB
ANION GAP SERPL CALC-SCNC: 10 MMOL/L — SIGNIFICANT CHANGE UP (ref 5–17)
BUN SERPL-MCNC: 16 MG/DL — SIGNIFICANT CHANGE UP (ref 7–23)
CALCIUM SERPL-MCNC: 9.4 MG/DL — SIGNIFICANT CHANGE UP (ref 8.5–10.1)
CHLORIDE SERPL-SCNC: 107 MMOL/L — SIGNIFICANT CHANGE UP (ref 96–108)
CO2 SERPL-SCNC: 22 MMOL/L — SIGNIFICANT CHANGE UP (ref 22–31)
CREAT SERPL-MCNC: 1.1 MG/DL — SIGNIFICANT CHANGE UP (ref 0.5–1.3)
CRP SERPL-MCNC: 8.5 MG/DL — HIGH (ref 0–0.4)
CULTURE RESULTS: NO GROWTH — SIGNIFICANT CHANGE UP
ERYTHROCYTE [SEDIMENTATION RATE] IN BLOOD: 46 MM/HR — HIGH (ref 0–20)
GLUCOSE SERPL-MCNC: 138 MG/DL — HIGH (ref 70–99)
HCT VFR BLD CALC: 42.9 % — SIGNIFICANT CHANGE UP (ref 39–50)
HGB BLD-MCNC: 15.2 G/DL — SIGNIFICANT CHANGE UP (ref 13–17)
MCHC RBC-ENTMCNC: 35.4 GM/DL — SIGNIFICANT CHANGE UP (ref 32–36)
MCHC RBC-ENTMCNC: 37.1 PG — HIGH (ref 27–34)
MCV RBC AUTO: 104.9 FL — HIGH (ref 80–100)
PLATELET # BLD AUTO: 196 K/UL — SIGNIFICANT CHANGE UP (ref 150–400)
POTASSIUM SERPL-MCNC: 3.7 MMOL/L — SIGNIFICANT CHANGE UP (ref 3.5–5.3)
POTASSIUM SERPL-SCNC: 3.7 MMOL/L — SIGNIFICANT CHANGE UP (ref 3.5–5.3)
RBC # BLD: 4.09 M/UL — LOW (ref 4.2–5.8)
RBC # FLD: 13 % — SIGNIFICANT CHANGE UP (ref 10.3–14.5)
SODIUM SERPL-SCNC: 139 MMOL/L — SIGNIFICANT CHANGE UP (ref 135–145)
SPECIMEN SOURCE: SIGNIFICANT CHANGE UP
WBC # BLD: 7.9 K/UL — SIGNIFICANT CHANGE UP (ref 3.8–10.5)
WBC # FLD AUTO: 7.9 K/UL — SIGNIFICANT CHANGE UP (ref 3.8–10.5)

## 2017-06-19 PROCEDURE — 99231 SBSQ HOSP IP/OBS SF/LOW 25: CPT

## 2017-06-19 PROCEDURE — 83605 ASSAY OF LACTIC ACID: CPT

## 2017-06-19 PROCEDURE — 83036 HEMOGLOBIN GLYCOSYLATED A1C: CPT

## 2017-06-19 PROCEDURE — 87798 DETECT AGENT NOS DNA AMP: CPT

## 2017-06-19 PROCEDURE — 85652 RBC SED RATE AUTOMATED: CPT

## 2017-06-19 PROCEDURE — 96365 THER/PROPH/DIAG IV INF INIT: CPT

## 2017-06-19 PROCEDURE — 93970 EXTREMITY STUDY: CPT

## 2017-06-19 PROCEDURE — 86360 T CELL ABSOLUTE COUNT/RATIO: CPT

## 2017-06-19 PROCEDURE — 71250 CT THORAX DX C-: CPT

## 2017-06-19 PROCEDURE — 84145 PROCALCITONIN (PCT): CPT

## 2017-06-19 PROCEDURE — 99285 EMERGENCY DEPT VISIT HI MDM: CPT | Mod: 25

## 2017-06-19 PROCEDURE — 87899 AGENT NOS ASSAY W/OPTIC: CPT

## 2017-06-19 PROCEDURE — 99239 HOSP IP/OBS DSCHRG MGMT >30: CPT

## 2017-06-19 PROCEDURE — 87633 RESP VIRUS 12-25 TARGETS: CPT

## 2017-06-19 PROCEDURE — 81001 URINALYSIS AUTO W/SCOPE: CPT

## 2017-06-19 PROCEDURE — 85027 COMPLETE CBC AUTOMATED: CPT

## 2017-06-19 PROCEDURE — 87486 CHLMYD PNEUM DNA AMP PROBE: CPT

## 2017-06-19 PROCEDURE — 80053 COMPREHEN METABOLIC PANEL: CPT

## 2017-06-19 PROCEDURE — 71046 X-RAY EXAM CHEST 2 VIEWS: CPT

## 2017-06-19 PROCEDURE — 87040 BLOOD CULTURE FOR BACTERIA: CPT

## 2017-06-19 PROCEDURE — 87086 URINE CULTURE/COLONY COUNT: CPT

## 2017-06-19 PROCEDURE — 85610 PROTHROMBIN TIME: CPT

## 2017-06-19 PROCEDURE — 87070 CULTURE OTHR SPECIMN AEROBIC: CPT

## 2017-06-19 PROCEDURE — 87449 NOS EACH ORGANISM AG IA: CPT

## 2017-06-19 PROCEDURE — 87581 M.PNEUMON DNA AMP PROBE: CPT

## 2017-06-19 PROCEDURE — 86140 C-REACTIVE PROTEIN: CPT

## 2017-06-19 PROCEDURE — 93005 ELECTROCARDIOGRAM TRACING: CPT

## 2017-06-19 PROCEDURE — 94640 AIRWAY INHALATION TREATMENT: CPT

## 2017-06-19 PROCEDURE — 99221 1ST HOSP IP/OBS SF/LOW 40: CPT

## 2017-06-19 PROCEDURE — 94760 N-INVAS EAR/PLS OXIMETRY 1: CPT

## 2017-06-19 PROCEDURE — 94664 DEMO&/EVAL PT USE INHALER: CPT

## 2017-06-19 PROCEDURE — 80048 BASIC METABOLIC PNL TOTAL CA: CPT

## 2017-06-19 RX ORDER — NICOTINE POLACRILEX 2 MG
1 GUM BUCCAL
Qty: 14 | Refills: 0 | OUTPATIENT
Start: 2017-06-19 | End: 2017-07-03

## 2017-06-19 RX ORDER — NICOTINE POLACRILEX 2 MG
1 GUM BUCCAL
Qty: 42 | Refills: 0 | OUTPATIENT
Start: 2017-06-19 | End: 2017-07-31

## 2017-06-19 RX ORDER — IPRATROPIUM/ALBUTEROL SULFATE 18-103MCG
3 AEROSOL WITH ADAPTER (GRAM) INHALATION
Qty: 1 | Refills: 0 | OUTPATIENT
Start: 2017-06-19

## 2017-06-19 RX ORDER — NICOTINE POLACRILEX 2 MG
1 GUM BUCCAL
Qty: 40 | Refills: 0 | OUTPATIENT
Start: 2017-06-19 | End: 2017-07-29

## 2017-06-19 RX ADMIN — Medication 1 PATCH: at 12:08

## 2017-06-19 RX ADMIN — Medication 3 MILLILITER(S): at 15:00

## 2017-06-19 RX ADMIN — Medication 1: at 17:28

## 2017-06-19 RX ADMIN — PIPERACILLIN AND TAZOBACTAM 25 GRAM(S): 4; .5 INJECTION, POWDER, LYOPHILIZED, FOR SOLUTION INTRAVENOUS at 06:42

## 2017-06-19 RX ADMIN — Medication 1 PATCH: at 12:03

## 2017-06-19 RX ADMIN — Medication 2 TABLET(S): at 06:43

## 2017-06-19 RX ADMIN — PIPERACILLIN AND TAZOBACTAM 25 GRAM(S): 4; .5 INJECTION, POWDER, LYOPHILIZED, FOR SOLUTION INTRAVENOUS at 15:24

## 2017-06-19 RX ADMIN — Medication 3 MILLILITER(S): at 11:35

## 2017-06-19 RX ADMIN — Medication 3: at 12:03

## 2017-06-19 RX ADMIN — Medication 3 MILLILITER(S): at 07:53

## 2017-06-19 NOTE — DISCHARGE NOTE ADULT - HOSPITAL COURSE
57 yo M with PMHx of colon CA s/p resection (May 2016), Bladder CA s/p resection with urostomy (2015), Left sided nephrectomy due to tumor (2012), ?DM2 (not on medications), HLD, and HIV presented to the ED with fever, chills, coughing, and nausea x 3 days. The patient stated that his symptoms began with a productive cough with yellow and bloody sputum. Tmax 101 at home. The patient went to an urgent care 2 days prior to admission. He received Levaquin 750 qd and has taken two doses. The symptoms did not improve which brought him to the ED. The patient admited to headache, fever, chills, body aches, nausea and cough. He also admited to occasional sharp right inguinal pain s/p bladder resection. Denied chest pain, SOB, abdominal pain, diarrhea, constipation, hematuria, melena. The patient has an extensive cancer history which is being followed up at [facility]. Each of his cancers were different primary tumors. The patient states that he has been told he was a diabetic but refuses to take medications. Current daily smoker, 1 pack per day. B/l LE doppler - No evidence of bilateral lower extremity deep venous thrombosis. CT chest - CT findings as discussed which may represent infectious or inflammatory small airway disease. Small, nonspecific pulmonary nodules. Recommend short interval follow-up chest CT examination in 10-12 weeks. There is a mildly tortuous aorta. Heart size is upper limits of normal. CXR - are prominent bronchovascular markings at the lung bases bilaterally which may reflect bronchial wall thickening. No lobar lung consolidation or significant pleural effusion is noted.    Pt was treated for PNA and was treated with 2 days of IV zosyn. After sputum Cx neg, Blood Cx neg, UA neg, UCx neg, and RVP neg pt was taken off abx as likely not infectious. Michelle CRONIN followed as well as PulmDr. Hoskins. Opportunistic infection was not likely considering patient's self-reported CD4 count >1,000. Pt was treated with duonebs and Tylenol PRN for fever. HIV was stable and treated with home medications Kaletra and Combivir.  Diabetes mellitus is non compliant with medications. Pt was on HISS, accu checks, hypoglycemia protocol, and diabetic diet. High cholesterol was treated with lipitor. Pt has a hx of bladder and kidney carcinoma (S/P left nephrectomy). Pt is to f/u with oncologist out patient. Pt is a current day smoker which was controlled with a nicotine patch and cessation was encouraged.    Pt is medically optimized for discharge. Look at todays EMR for progress note on today, 6/19/17, day of discharge    More than 30 min was spent on this note. 57 yo M with PMHx of colon CA s/p resection (May 2016), Bladder CA s/p resection with urostomy (2015), Left sided nephrectomy due to tumor (2012), ?DM2 (not on medications), HLD, and HIV presented to the ED with fever, chills, coughing, and nausea x 3 days. The patient stated that his symptoms began with a productive cough with yellow and bloody sputum. Tmax 101 at home. The patient went to an urgent care 2 days prior to admission. He received Levaquin 750 qd and has taken two doses. The symptoms did not improve which brought him to the ED. The patient admited to headache, fever, chills, body aches, nausea and cough. He also admited to occasional sharp right inguinal pain s/p bladder resection. Denied chest pain, SOB, abdominal pain, diarrhea, constipation, hematuria, melena. The patient has an extensive cancer history which is being followed up at [facility]. Each of his cancers were different primary tumors. The patient states that he has been told he was a diabetic but refuses to take medications. Current daily smoker, 1 pack per day. B/l LE doppler - No evidence of bilateral lower extremity deep venous thrombosis. CT chest - CT findings as discussed which may represent infectious or inflammatory small airway disease. Small, nonspecific pulmonary nodules. Recommend short interval follow-up chest CT examination in 10-12 weeks. There is a mildly tortuous aorta. Heart size is upper limits of normal. CXR - are prominent bronchovascular markings at the lung bases bilaterally which may reflect bronchial wall thickening. No lobar lung consolidation or significant pleural effusion is noted.    Pt was treated empirically for possible PNA (IV zosyn). After sputum Cx neg, Blood Cx neg, UA neg, UCx neg, and RVP neg pt was taken off abx as likely not infectious. Michelle CRONIN followed as well as PulmDr. Hoskins. Opportunistic infection was not likely considering patient's self-reported CD4 count >1,000. Pt was treated with duonebs and Tylenol PRN for fever. HIV was stable and treated with home medications Kaletra and Combivir.  Diabetes mellitus is non compliant with medications. Pt was on HISS, accu checks, hypoglycemia protocol, and diabetic diet. High cholesterol was treated with lipitor. Pt has a hx of bladder and kidney carcinoma (S/P left nephrectomy). Pt is to f/u with oncologist out patient. Pt is a current day smoker which was controlled with a nicotine patch and cessation was encouraged.    Pt is medically optimized for discharge. He will be provided with a copy of his CT chest images to take to his oncologist for further comparison and further workup if needed. Beck's symptoms resolved and feeling well, ambulating in chandler with no respiratory symptoms. He was cleared by ID for discharge and follow up with his physicians at [facility]. 55 yo M with PMHx of colon CA s/p resection (May 2016), Bladder CA s/p resection with urostomy (2015), Left sided nephrectomy due to tumor (2012), ?DM2 (not on medications), HLD, and HIV presented to the ED with fever, chills, coughing, and nausea x 3 days. The patient stated that his symptoms began with a productive cough with yellow and bloody sputum. Tmax 101 at home. The patient went to an urgent care 2 days prior to admission. He received Levaquin 750 qd and has taken two doses. The symptoms did not improve which brought him to the ED. The patient admited to headache, fever, chills, body aches, nausea and cough. He also admited to occasional sharp right inguinal pain s/p bladder resection. Denied chest pain, SOB, abdominal pain, diarrhea, constipation, hematuria, melena. The patient has an extensive cancer history which is being followed up at [facility]. Each of his cancers were different primary tumors. The patient states that he has been told he was a diabetic but refuses to take medications. Current daily smoker, 1 pack per day. B/l LE doppler - No evidence of bilateral lower extremity deep venous thrombosis. CT chest - CT findings as discussed which may represent infectious or inflammatory small airway disease. Small, nonspecific pulmonary nodules. Recommend short interval follow-up chest CT examination in 10-12 weeks. There is a mildly tortuous aorta. Heart size is upper limits of normal. CXR - are prominent bronchovascular markings at the lung bases bilaterally which may reflect bronchial wall thickening. No lobar lung consolidation or significant pleural effusion is noted.    Pt was treated empirically for possible PNA (IV zosyn). After sputum Cx neg, Blood Cx neg, UA neg, UCx neg, and RVP neg pt was taken off abx as it was felt his infection was more likely a viral bronchitis. Michelle CRONIN followed as well as PulDr. Gato watson. Opportunistic infection was not likely considering patient's self-reported CD4 count >1,000. Pt was treated with duonebs and Tylenol PRN for fever. HIV was stable and treated with home medications Kaletra and Combivir.  Diabetes mellitus is non compliant with medications. Pt was on HISS, accu checks, hypoglycemia protocol, and diabetic diet. High cholesterol was treated with lipitor. Pt has a hx of bladder and kidney carcinoma (S/P left nephrectomy). Pt is to f/u with oncologist out patient. Pt is a current day smoker which was controlled with a nicotine patch and cessation was encouraged.    Pt is medically optimized for discharge. He will be provided with a copy of his CT chest images to take to his oncologist and his primary care physician for further comparison and further workup if needed. Patient's symptoms resolved and feeling well, ambulating in chandler with no respiratory symptoms. He was cleared by ID for discharge and follow up with his physicians at [facility]. His symptoms were most likely due to a viral bronchitis which has now improved. No need for further antibiotic therapy at this time.     Case was discussed with Dr. Carlson (PCP) and he is aware of plan. Patient will follow up with him. Will fax discharge note to him.    Daughter was provided with a disc containing CT images for follow up with patient's outpatient physicians.

## 2017-06-19 NOTE — DISCHARGE NOTE ADULT - MEDICATION SUMMARY - MEDICATIONS TO TAKE
I will START or STAY ON the medications listed below when I get home from the hospital:    Lipitor 40 mg oral tablet  -- 1 tab(s) by mouth once a day  -- Indication: For High cholesterol    Kaletra 200 mg-50 mg oral tablet  -- 2 tab(s) by mouth 2 times a day  -- Indication: For HIV (human immunodeficiency virus infection)    lamiVUDine-zidovudine 150 mg-300 mg oral tablet  -- 1 tab(s) by mouth 2 times a day  -- Indication: For High cholesterol    albuterol-ipratropium 2.5 mg-0.5 mg/3 mL inhalation solution  -- 3 milliliter(s) inhaled 4 times a day, As Needed -for allergy symptoms -for bladder spasms -for shortness of breath and/or wheezing  -- Indication: For SOB    Nicoderm C-Q 7 mg/24 hr transdermal film, extended release  -- 1 patch by transdermal patch once a day  -- Do not take this drug if you are pregnant.  For external use only.  It is very important that you take or use this exactly as directed.  Do not skip doses or discontinue unless directed by your doctor.  Remove old patch prior to applying a new patch.    -- Indication: For Smoking    Nicoderm C-Q 14 mg/24 hr transdermal film, extended release  -- 1 patch by transdermal patch once a day  -- Do not take this drug if you are pregnant.  For external use only.  It is very important that you take or use this exactly as directed.  Do not skip doses or discontinue unless directed by your doctor.  Remove old patch prior to applying a new patch.    -- Indication: For smoking    Nicoderm C-Q 21 mg/24 hr transdermal film, extended release  -- 1 patch by transdermal patch once a day  -- Do not take this drug if you are pregnant.  For external use only.  It is very important that you take or use this exactly as directed.  Do not skip doses or discontinue unless directed by your doctor.  Remove old patch prior to applying a new patch.    -- Indication: For smoking

## 2017-06-19 NOTE — PROGRESS NOTE ADULT - SUBJECTIVE AND OBJECTIVE BOX
55 yo M with PMHx of colon CA s/p resection (May 2016), Bladder CA s/p resection with urostomy (), Left sided nephrectomy due to tumor (), ?DM2 (not on medications), HLD, and HIV presented to the ED with fever, chills, coughing, and nausea x 3 days. Patient feels much better today but still has persistent productive cough with yellow sputum. Denies chest pain or SOB at present.  Pt is on day  2 of emperic IV Zosyn.Sputum Cx neg, Blood Cx neg, UA neg, UCx neg. RVP neg. Awaiting [facility] for previous imaging for pulmonary nodules, new vs old? Opportunistic infection not likely considering patient's self-reported CD4 count >1,000.    REVIEW OF SYSTEMS:    CONSTITUTIONAL:  denies fevers and chills,   EYES/ENT: No visual changes, no throat pain   RESPIRATORY: +cough, No wheezing, hemoptysis; No shortness of breath  CARDIOVASCULAR: No chest pain or palpitations  GASTROINTESTINAL: No abdominal, nausea, vomiting, or hematemesis; No diarrhea or constipation. No melena or hematochezia.  GENITOURINARY: No dysuria, frequency or hematuria  NEUROLOGICAL: No dizziness, numbness, or weakness  SKIN: No itching, burning, rashes, or lesions   All other review of systems is negative unless indicated above.      Vital Signs Last 24 Hrs  T(C): 37.2, Max: 37.2 (06-18 @ 22:40)  T(F): 98.9, Max: 98.9 (06-18 @ 22:40)  HR: 80 (80 - 109)  BP: 114/71 (110/72 - 129/78)  BP(mean): --  RR: 18 (16 - 18)  SpO2: 99% (95% - 99%)    PHYSICAL EXAM:     GENERAL: no acute distress  HEENT: NC/AT, EOMI, neck supple, MMM  RESPIRATORY: minimal coarse breath sounds bilaterally, No wheeze  CARDIOVASCULAR: RRR, no murmurs, gallops, rubs  ABDOMINAL: soft, non-tender, non-distended, positive bowel sounds   EXTREMITIES: no clubbing, cyanosis, or edema  NEUROLOGICAL: alert and oriented x 3, non-focal  SKIN: no rashes or lesions   MUSCULOSKELETAL: no gross joint deformity                              15.2   7.9   )-----------( 196      ( 2017 08:13 )             42.9     06-19    139  |  107  |  16  ----------------------------<  138<H>  3.7   |  22  |  1.10    Ca    9.4      2017 08:13    TPro  6.9  /  Alb  3.4  /  TBili  0.7  /  DBili  x   /  AST  13<L>  /  ALT  11<L>  /  AlkPhos  107  -18    PT/INR - ( 2017 23:30 )   PT: 12.3 sec;   INR: 1.13 ratio           Urinalysis Basic - ( 2017 01:51 )    Color: Yellow / Appearance: Clear / S.010 / pH: x  Gluc: x / Ketone: Negative  / Bili: Negative / Urobili: Negative   Blood: x / Protein: 25 mg/dL / Nitrite: Negative   Leuk Esterase: Negative / RBC: 3-5 /HPF / WBC 11-25   Sq Epi: x / Non Sq Epi: x / Bacteria: Few       CAPILLARY BLOOD GLUCOSE  287 (2017 12:01)  140 (2017 07:41)  150 (2017 22:40)  172 (2017 16:52)      MEDICATIONS  (STANDING):  ALBUTerol/ipratropium for Nebulization 3milliLiter(s) Nebulizer four times a day  piperacillin/tazobactam IVPB. 3.375Gram(s) IV Intermittent every 8 hours  enoxaparin Injectable 40milliGRAM(s) SubCutaneous every 24 hours  insulin lispro (HumaLOG) corrective regimen sliding scale  SubCutaneous three times a day before meals  dextrose 5%. 1000milliLiter(s) IV Continuous <Continuous>  dextrose 50% Injectable 12.5Gram(s) IV Push once  dextrose 50% Injectable 25Gram(s) IV Push once  atorvastatin 40milliGRAM(s) Oral at bedtime  zidovudine 300 mG/lamiVUDine 150 mG 1Tablet(s) Oral two times a day  dextrose 50% Injectable 25Gram(s) IV Push once  nicotine - 21 mG/24Hr(s) Patch 1patch Transdermal daily  lopinavir 200 mG/ritonavir 50 mG 2Tablet(s) Oral two times a day    MEDICATIONS  (PRN):  ondansetron Injectable 4milliGRAM(s) IV Push every 6 hours PRN Nausea  dextrose Gel 1Dose(s) Oral once PRN Blood Glucose LESS THAN 70 milliGRAM(s)/deciliter  acetaminophen   Tablet 650milliGRAM(s) Oral every 6 hours PRN For Temp greater than 38 C (100.4 F)  glucagon  Injectable 1milliGRAM(s) IntraMuscular once PRN Glucose LESS THAN 70 milligrams/deciliter

## 2017-06-19 NOTE — DISCHARGE NOTE ADULT - MEDICATION SUMMARY - MEDICATIONS TO STOP TAKING
I will STOP taking the medications listed below when I get home from the hospital:    levoFLOXacin 750 mg oral tablet  -- 1 tab(s) by mouth every 24 hours

## 2017-06-19 NOTE — PROGRESS NOTE ADULT - SUBJECTIVE AND OBJECTIVE BOX
NOLAJOLIEILDEFONSO is a 56yMale , patient examined and chart reviewed. Patient seen and examined today being followed for febrile syndrome with cough       INTERVAL HPI/ OVERNIGHT EVENTS: Remains afebrile, seen by pulmonary     PAST MEDICAL & SURGICAL HISTORY:  Diabetes mellitus  High cholesterol  Kidney carcinoma  Bladder cancer  Colon cancer  Finger amputation, no complication: right hand digits 3-5  H/O fasciotomy: LLE  Bladder absent: removed secondary to cancer  History of nephrectomy: left  History of colon resection      For details regarding the patient's social history, family history, and other miscellaneous elements, please refer the initial infectious diseases consultation and/or the admitting history and physical examination for this admission.      ROS:  CONSTITUTIONAL:  Negative fever or chills, feels well, good appetite  EYES:  Negative  blurry vision or double vision  CARDIOVASCULAR:  Negative for chest pain or palpitations  RESPIRATORY:  pos  for cough, NO wheezing, or SOB   GASTROINTESTINAL:  Negative for nausea, vomiting, diarrhea, constipation, or abdominal pain  GENITOURINARY:  Negative frequency, urgency or dysuria  NEUROLOGIC:  No headache, confusion, dizziness, lightheadedness  All other systems were reviewed and are negative         Current inpatient medications :    ANTIBIOTICS/RELEVANT:  piperacillin/tazobactam IVPB. 3.375Gram(s) IV Intermittent every 8 hours  zidovudine 300 mG/lamiVUDine 150 mG 1Tablet(s) Oral two times a day  nicotine - 21 mG/24Hr(s) Patch 1patch Transdermal daily  lopinavir 200 mG/ritonavir 50 mG 2Tablet(s) Oral two times a day      ondansetron Injectable 4milliGRAM(s) IV Push every 6 hours PRN  ALBUTerol/ipratropium for Nebulization 3milliLiter(s) Nebulizer four times a day  enoxaparin Injectable 40milliGRAM(s) SubCutaneous every 24 hours  insulin lispro (HumaLOG) corrective regimen sliding scale  SubCutaneous three times a day before meals  dextrose 5%. 1000milliLiter(s) IV Continuous <Continuous>  dextrose Gel 1Dose(s) Oral once PRN  dextrose 50% Injectable 12.5Gram(s) IV Push once  dextrose 50% Injectable 25Gram(s) IV Push once  atorvastatin 40milliGRAM(s) Oral at bedtime  acetaminophen   Tablet 650milliGRAM(s) Oral every 6 hours PRN  dextrose 50% Injectable 25Gram(s) IV Push once  glucagon  Injectable 1milliGRAM(s) IntraMuscular once PRN      Objective:    I & Os for current day (as of  @ 08:25)  =============================================  IN: 440 ml / OUT: 300 ml / NET: 140 ml    T(C): 37.2, Max: 37.2 ( @ 22:40)  HR: 87 (82 - 109)  BP: 114/71 (110/72 - 129/78)  RR: 18 (16 - 18)  SpO2: 96% (95% - 98%)  Wt(kg): --      Physical Exam:  GEN: NAD, pleasant  HEENT: normocephalic and atraumatic.  EOMI. AILEEN. Moist mucosa. Clear Posterior pharynx.  NECK: Supple. No carotid bruits.  No lymphadenopathy or thyromegaly.  LUNGS: Clear to auscultation.  HEART: Regular rate and rhythm without murmur.  ABDOMEN: Soft, nontender, and nondistended.  Positive bowel sounds.  No hepatosplenomegaly was noted.  EXTREMITIES: Without any cyanosis, clubbing, rash, lesions or edema. 2 + peripheral pulses   NEUROLOGIC: Alert & oriented x 3 , No focal neurological deficits, DTR's  intact and symmetric    SKIN: No ulceration or induration present.      LABS:                          15.0   6.3   )-----------( 159      ( 2017 05:46 )             42.3           137  |  106  |  14  ----------------------------<  209<H>  3.4<L>   |  25  |  1.10    Ca    9.0      2017 05:46    TPro  6.9  /  Alb  3.4  /  TBili  0.7  /  DBili  x   /  AST  13<L>  /  ALT  11<L>  /  AlkPhos  107        PT/INR - ( 2017 23:30 )   PT: 12.3 sec;   INR: 1.13 ratio      Culture Data:  Rapid Respiratory Viral Panel (17 @ 10:53)    Rapid RVP Result: NotDetec:      Culture - Sputum . (17 @ 11:59)    Gram Stain:   Numerous white blood cells Rare Squamous Epithelial Cells per low power  field  Few Gram Positive Rods Few Gram Negative Rods and  Few Gram Positive Cocci in Clusters per oil power field    Specimen Source: .Sputum Sputum    conical received    Culture Results:   Normal Respiratory Clara present        Urinalysis Basic - ( 2017 01:51 )    Color: Yellow / Appearance: Clear / S.010 / pH: x  Gluc: x / Ketone: Negative  / Bili: Negative / Urobili: Negative   Blood: x / Protein: 25 mg/dL / Nitrite: Negative   Leuk Esterase: Negative / RBC: 3-5 /HPF / WBC 11-25   Sq Epi: x / Non Sq Epi: x / Bacteria: Few    Assessment :     55 yo M with PMHx of colon CA s/p resection (May 2016), Bladder CA s/p resection with urostomy (), Left sided nephrectomy due to tumor (), ?DM2 (not on medications), HLD, and HIV on ARV presented to the ED with fever, chills, coughing, and nausea x 3 days. His exam is non focal . Ct scan findings show pulmonary nodules could be inflammatory or metastatic disease.    Most likely he has a Viral syndrome with bronchitis although RVP is negative     Plan :   Will continue with IV antibiotics pending cs results   Will check CD4 counts, if fevers recurs and all cs negative then consider bronchoscopy    will need to get in touch with [facility] to see what did ct chest results were and if these lung nodules were present   he is advised to stop smoking, a smoking cessation counselling was give to him  but he is not motivated to  quit smoking     Continue with present regime .  Appropriate use of antibiotics and adverse effects reviewed.                    I have discussed the above plan of care with patient/ family in detail. They expressed understanding of the  treatment plan . Risks, benefits and alternatives discussed in detail. I have asked if they have any questions or concerns and appropriately addressed them to the best of my ability .    > 35 minutes were spent in direct patient care reviewing notes, medications ,labs data/ imaging , discussion with multidisciplinary team.    Thank you for allowing me to participate in care of your patient .    Stef Martinez MD  [phone] NOLAILDEFONSO DAVE is a 56yMale , patient examined and chart reviewed. Patient seen and examined today being followed for febrile syndrome with cough       INTERVAL HPI/ OVERNIGHT EVENTS: Remains afebrile, seen by pulmonary . feels better , denies any chest pains or sob. daughter at bedside , she claims he has had pulmonary nodules in the past . . So far all sepsis work up is negative     PAST MEDICAL & SURGICAL HISTORY:  Diabetes mellitus  High cholesterol  Kidney carcinoma  Bladder cancer  Colon cancer  Finger amputation, no complication: right hand digits 3-5  H/O fasciotomy: LLE  Bladder absent: removed secondary to cancer  History of nephrectomy: left  History of colon resection      For details regarding the patient's social history, family history, and other miscellaneous elements, please refer the initial infectious diseases consultation and/or the admitting history and physical examination for this admission.      ROS:  CONSTITUTIONAL:  Negative fever or chills, feels well, good appetite  EYES:  Negative  blurry vision or double vision  CARDIOVASCULAR:  Negative for chest pain or palpitations  RESPIRATORY:  pos  for cough, NO wheezing, or SOB   GASTROINTESTINAL:  Negative for nausea, vomiting, diarrhea, constipation, or abdominal pain  GENITOURINARY:  Negative frequency, urgency or dysuria  NEUROLOGIC:  No headache, confusion, dizziness, lightheadedness  All other systems were reviewed and are negative         Current inpatient medications :    ANTIBIOTICS/RELEVANT:  piperacillin/tazobactam IVPB. 3.375Gram(s) IV Intermittent every 8 hours  zidovudine 300 mG/lamiVUDine 150 mG 1Tablet(s) Oral two times a day  nicotine - 21 mG/24Hr(s) Patch 1patch Transdermal daily  lopinavir 200 mG/ritonavir 50 mG 2Tablet(s) Oral two times a day      ondansetron Injectable 4milliGRAM(s) IV Push every 6 hours PRN  ALBUTerol/ipratropium for Nebulization 3milliLiter(s) Nebulizer four times a day  enoxaparin Injectable 40milliGRAM(s) SubCutaneous every 24 hours  insulin lispro (HumaLOG) corrective regimen sliding scale  SubCutaneous three times a day before meals  dextrose 5%. 1000milliLiter(s) IV Continuous <Continuous>  dextrose Gel 1Dose(s) Oral once PRN  dextrose 50% Injectable 12.5Gram(s) IV Push once  dextrose 50% Injectable 25Gram(s) IV Push once  atorvastatin 40milliGRAM(s) Oral at bedtime  acetaminophen   Tablet 650milliGRAM(s) Oral every 6 hours PRN  dextrose 50% Injectable 25Gram(s) IV Push once  glucagon  Injectable 1milliGRAM(s) IntraMuscular once PRN      Objective:    I & Os for current day (as of  @ 08:25)  =============================================  IN: 440 ml / OUT: 300 ml / NET: 140 ml    T(C): 37.2, Max: 37.2 ( @ 22:40)  HR: 87 (82 - 109)  BP: 114/71 (110/72 - 129/78)  RR: 18 (16 - 18)  SpO2: 96% (95% - 98%)  Wt(kg): --      Physical Exam:  GEN: NAD, pleasant  HEENT: normocephalic and atraumatic.  EOMI. AILEEN. Moist mucosa. Clear Posterior pharynx.  NECK: Supple. No carotid bruits.  No lymphadenopathy or thyromegaly.  LUNGS: Clear to auscultation.  HEART: Regular rate and rhythm without murmur.  ABDOMEN: Soft, nontender, and nondistended.  Positive bowel sounds.  No hepatosplenomegaly was noted.  EXTREMITIES: Without any cyanosis, clubbing, rash, lesions or edema. 2 + peripheral pulses   NEUROLOGIC: Alert & oriented x 3 , No focal neurological deficits, DTR's  intact and symmetric    SKIN: No ulceration or induration present.      LABS:                          15.0   6.3   )-----------( 159      ( 2017 05:46 )             42.3           137  |  106  |  14  ----------------------------<  209<H>  3.4<L>   |  25  |  1.10    Ca    9.0      2017 05:46    TPro  6.9  /  Alb  3.4  /  TBili  0.7  /  DBili  x   /  AST  13<L>  /  ALT  11<L>  /  AlkPhos  107        PT/INR - ( 2017 23:30 )   PT: 12.3 sec;   INR: 1.13 ratio      Culture Data:  Rapid Respiratory Viral Panel (17 @ 10:53)    Rapid RVP Result: Not Detec:      Culture - Sputum . (17 @ 11:59)    Gram Stain:   Numerous white blood cells Rare Squamous Epithelial Cells per low power  field  Few Gram Positive Rods Few Gram Negative Rods and  Few Gram Positive Cocci in Clusters per oil power field    Specimen Source: .Sputum Sputum    conical received    Culture Results:   Normal Respiratory Clara present        Urinalysis Basic - ( 2017 01:51 )    Color: Yellow / Appearance: Clear / S.010 / pH: x  Gluc: x / Ketone: Negative  / Bili: Negative / Urobili: Negative   Blood: x / Protein: 25 mg/dL / Nitrite: Negative   Leuk Esterase: Negative / RBC: 3-5 /HPF / WBC 11-25   Sq Epi: x / Non Sq Epi: x / Bacteria: Few    Assessment :     57 yo M with PMHx of colon CA s/p resection (May 2016), Bladder CA s/p resection with urostomy (), Left sided nephrectomy due to tumor (), ?DM2 (not on medications), HLD, and HIV on ARV presented to the ED with fever, chills, coughing, and nausea x 3 days. His exam is non focal . Ct scan findings show pulmonary nodules could be inflammatory or metastatic disease.    Most likely he has a Viral syndrome with bronchitis although RVP is negative     Plan :   Will DC antibiotics and agree he can be dc home and follow with his oncologist at [facility] and compare old ct scan. he was given a CD of the ct chest.  He is advised to follow up with his HIV specialist and have CD4 counts repeated .   He is advised to stop smoking, a smoking cessation counselling was give to him  but he is not motivated to  quit smoking     Continue with present regime .  Appropriate use of antibiotics and adverse effects reviewed.    I have discussed the above plan of care with patient and his daughter  in detail. They expressed understanding of the  treatment plan . Risks, benefits and alternatives discussed in detail. I have asked if they have any questions or concerns and appropriately addressed them to the best of my ability .    > 35 minutes were spent in direct patient care reviewing notes, medications ,labs data/ imaging , discussion with multidisciplinary team.    Thank you for allowing me to participate in care of your patient .    Stef Martinez MD  [phone]

## 2017-06-19 NOTE — DISCHARGE NOTE ADULT - CARE PROVIDERS DIRECT ADDRESSES
[email or web address]

## 2017-06-19 NOTE — DISCHARGE NOTE ADULT - PLAN OF CARE
resolved pt is to f/u with PMD within a week regarding blood sugar control f/u with Dr. Rao - juan carlos CT from this admit and compare with pervious study stable continue current regimen f/u with Dr. Rao - juan carlos ruiz with CT images from this admission to compare previous imaging.  Seek medical care if fever or respiratory difficulty occurs again. f/u with Dr. Rao as well as your primary care Dr. Carlson - bring disc with CT images from this admission to compare previous imaging.  Seek medical care if fever or respiratory difficulty occurs again.

## 2017-06-19 NOTE — DISCHARGE NOTE ADULT - PROVIDER TOKENS
TOKEN:'[id number]',FREE:[LAST:[Yara],FIRST:[],PHONE:[(   )    -],FAX:[(   )    -],ADDRESS:[PCP]] TOKEN:'[id number]',FREE:[LAST:[truman],FIRST:[noemi],PHONE:[phone]],FAX:[phone]]]

## 2017-06-19 NOTE — DISCHARGE NOTE ADULT - SECONDARY DIAGNOSIS.
Diabetes mellitus Kidney carcinoma HIV (human immunodeficiency virus infection) High cholesterol Bladder cancer

## 2017-06-19 NOTE — PROGRESS NOTE ADULT - SUBJECTIVE AND OBJECTIVE BOX
Patient seen and examined today Plan discussed/Questions answered  (with patient/ancillary staff/colleagues) Chart notes reviewd More detailed Pulm/Critical Care notes, assessment and plan  will be added later today as needed after reviewing further labs as they become available     Notable interval events reviewed ID notes reviewed ID recommends if fevers recurs and all cs negative then consider bronchoscopy      ROS/PE  . REVIEW OF SYMPTOMS    Able to give ROS  Yes     RELIABLE YES   Weakness Yes   Chills No   Vision changes No  Lymph nodes No enlarged glands   Endocrine No unexplained hair loss No het or cold intolerance   Allergy No hives   Sore throat No   Coughing blood No  Headache No  Confusion YES  Chest pain No  Palpitations No   Pain abdomen NO   Shortness of breath YES  Vomiting NO  Pain neck No  Pain abdomen NO     PHYSICAL EXAM    HEENT Unremarkable PERRLA atraumatic  RESP Fair air entry EXP prolonged    Harsh breath sound Resp distres mild  CARDIAC S1 S2 No S3     NO JVD   Awake Alert and ORIENTED x THREE  ABDOMEN SOFT BS PRESENT NOT DISTENDED No hepatosplenomegaly  PEDAL EDEMA present No calf tenderness  NO rash GENERAL Not TOXIC looking  . Patient seen and examined today Plan discussed/Questions answered  (with patient/ancillary staff/colleagues) Chart notes reviewd More detailed Pulm/Critical Care notes, assessment and plan  will be added later today as needed after reviewing further labs as they become available     Notable interval events reviewed ID notes reviewed ID recommends if fevers recurs and all cs negative then consider bronchoscopy      ROS/PE  . REVIEW OF SYMPTOMS    Able to give ROS  Yes     RELIABLE YES   Weakness Yes   Chills No   Vision changes No  Lymph nodes No enlarged glands   Endocrine No unexplained hair loss No het or cold intolerance   Allergy No hives   Sore throat No   Coughing blood No  Headache No  Confusion YES  Chest pain No  Palpitations No   Pain abdomen NO   Shortness of breath YES  Vomiting NO  Pain neck No  Pain abdomen NO     PHYSICAL EXAM    HEENT Unremarkable PERRLA atraumatic  RESP Fair air entry EXP prolonged    Harsh breath sound Resp distres mild  CARDIAC S1 S2 No S3     NO JVD   Awake Alert and ORIENTED x THREE  ABDOMEN SOFT BS PRESENT NOT DISTENDED No hepatosplenomegaly  PEDAL EDEMA present No calf tenderness  NO rash GENERAL Not TOXIC looking  . PATIENT DESCRIPTION  56 m in [place] 36 y HO exposure to TB as per dtr HO INH chemoprophylaxis in past originally from [place] HIV positive since 2000 on Kaletra Combivir last CD4 1060 as per patient has never had bronch HO multiple serial cancers L kidney ca 2011 [facility] [place] L nephrectomy done 2011 Bladder CA 5/2015 sp ureteroileostomy 2015 rectal ca sp surgery [facility] [place] 2016 with postsurgical complication of compartment syndrome l leg urgen surgery done same day as colon surgery  disabled  quit working 2012 admitted [facility] 6/17/2017 because of cough and dyspnea x 2 d pta No recent travel No sick contact  He went to  and was started on levaquin which has not helped him and he came to [facility] and was admitted and Pulm consulted   VITALS/LABS  6/19/2017 afeb 87 114/70 18 96%   6/19/2017 W 7.9 Hb 15.2 Plt 196 Na 139 K 3.7 CO2 22 Cr 1.1 G 138   PROBLEM ASSESSMENT PLAN  RESP TRACT INFECTION IN HIV PATIENT   6/18 RVP n 6/18 sp c few gnr gp cocci clusters   6/18 pct .07 6/19 ESR 46 6/18 bc n 6/18 uc b   On zosyn (6/17)   6/19 As dw ID plan is to bronch if fever recurs   LUNG NODULES   1) Biapical scar 2) peribronchovasc  thickening and ggo l perihilar lingula 3) scattered poorly defined centrilobular nodular opacities eg sup rll 4) scattered subcm nodules tonya rul largest peripheral 4 mm post rul   6/18 Discussed with ID Feels we can hold off bronch for now   6/18 Pt may eventually need VATS bx   RO DVT  6/18 V duplex legs n   HIV   Lopinavir 200/ritonavir 50 (KALETRA 200 50) 2.2 (6/18) zidovudine 300 lamivudine 150 COMBIVIR 1.2 (6/18)   RESP   6/19 RA 96% Monitor po Pulse ox acceptable   COPD   On duoneb.4 (6/18)   HO URETEROILEOSTOMY   6/17/2017 RO UTI   SMOKER   nicotine 21 (6/18)   GLOBAL ISSUE/BEST PRACTICE:        PROBLEM:      Analgesia:     na                        PROBLEM: Sedation:     na               PROBLEM: HOB elevation:   yes             PROBLEM: Stress ulcer proph:    na                      PROBLEM: VTE prophylaxis:      lvnx 40 (6/18)                        PROBLEM: Glycemic control:    iss (6/18)             PROBLEM: Nutrition:    renal cons carb (6/18)                       PROBLEM: Advanced directive: na     PROBLEM: Allergies:  na  TIME SPENT Over 25 minutes aggregate time spent on encounter; activities included   direct patient care, counseling and/or coordinating care reviewing notes, lab data/ imaging , discussion with multidisciplinary team/ patient  /family. Risks, benefits, alternatives  discussed in detail. Questions/concerns  were addressed  to the best of my ability .

## 2017-06-19 NOTE — DISCHARGE NOTE ADULT - CARE PLAN
Principal Discharge DX:	Pneumonia  Goal:	resolved  Instructions for follow-up, activity and diet:	f/u with Dr. Jalen DELGADO from this admit and compare with pervious study  Secondary Diagnosis:	Diabetes mellitus  Instructions for follow-up, activity and diet:	pt is to f/u with PMD within a week regarding blood sugar control  Secondary Diagnosis:	Kidney carcinoma  Instructions for follow-up, activity and diet:	f/u with Dr. Jalen DELGADO from this admit and compare with pervious study  Secondary Diagnosis:	HIV (human immunodeficiency virus infection)  Instructions for follow-up, activity and diet:	stable continue current regimen  Secondary Diagnosis:	High cholesterol  Instructions for follow-up, activity and diet:	stable continue current regimen  Secondary Diagnosis:	Bladder cancer  Instructions for follow-up, activity and diet:	f/u with Dr. Jalen DELGADO from this admit and compare with pervious study Principal Discharge DX:	Viral bronchitis  Goal:	resolved  Instructions for follow-up, activity and diet:	f/u with Dr. Jalen rangel disc with CT images from this admission to compare previous imaging.  Seek medical care if fever or respiratory difficulty occurs again.  Secondary Diagnosis:	Diabetes mellitus  Instructions for follow-up, activity and diet:	pt is to f/u with PMD within a week regarding blood sugar control  Secondary Diagnosis:	Kidney carcinoma  Instructions for follow-up, activity and diet:	f/u with Dr. Jalen rangel CT from this admit and compare with pervious study  Secondary Diagnosis:	HIV (human immunodeficiency virus infection)  Instructions for follow-up, activity and diet:	stable continue current regimen  Secondary Diagnosis:	High cholesterol  Instructions for follow-up, activity and diet:	stable continue current regimen  Secondary Diagnosis:	Bladder cancer  Instructions for follow-up, activity and diet:	f/u with Dr. Jalen rangel CT from this admit and compare with pervious study Principal Discharge DX:	Viral bronchitis  Goal:	resolved  Instructions for follow-up, activity and diet:	f/u with Dr. Rao as well as your primary care Dr. Aldo rangel disc with CT images from this admission to compare previous imaging.  Seek medical care if fever or respiratory difficulty occurs again.  Secondary Diagnosis:	Diabetes mellitus  Instructions for follow-up, activity and diet:	pt is to f/u with PMD within a week regarding blood sugar control  Secondary Diagnosis:	Kidney carcinoma  Instructions for follow-up, activity and diet:	f/u with Dr. Jalen rangel CT from this admit and compare with pervious study  Secondary Diagnosis:	HIV (human immunodeficiency virus infection)  Instructions for follow-up, activity and diet:	stable continue current regimen  Secondary Diagnosis:	High cholesterol  Instructions for follow-up, activity and diet:	stable continue current regimen  Secondary Diagnosis:	Bladder cancer  Instructions for follow-up, activity and diet:	f/u with Dr. Jalen rangel CT from this admit and compare with pervious study

## 2017-06-19 NOTE — DISCHARGE NOTE ADULT - CARE PROVIDER_API CALL
Coco Rao), Hematology; Internal Medicine; Medical Oncology  [address] [place] [address]  Phone: [phone]  Fax: [phone]    Dr Yara  PCP  Phone: (   )    -  Fax: (   )    - Coco Rao), Hematology; Internal Medicine; Medical Oncology  [address] [place] [id number]  Phone: [phone]  Fax: [phone]    noemi laughlin  Phone: [phone]  Fax: [phone]

## 2017-06-19 NOTE — PROGRESS NOTE ADULT - ASSESSMENT
55 yo M with PMHx of colon CA s/p resection (May 2016), Bladder CA s/p resection with urostomy (2015), Left sided nephrectomy due to tumor (2012), ?DM2 (not on medications), HLD, and HIV presented to the ED with fever, chills, coughing, and nausea x 3 days admitted for pneumonia.

## 2017-06-19 NOTE — PROGRESS NOTE ADULT - PROBLEM SELECTOR PLAN 1
Continue empiric IV Zosyn - day 2  sputum Cx neg, Blood Cx neg, UA neg, UCx neg. RVP neg  ID, Michelle following, Will continue with IV antibiotics pending cs results. Will check CD4 counts, if fevers recurs and all cs negative then consider bronchoscopy. [place] [facility] [place] [facility] [place] for pulmonary nodules, new vs old?  Pulm Dr. Hoskins following - agrees with ID  Opportunistic infection not likely considering patient's self-reported CD4 count >1,000.  continue Duoneb  continue Tylenol PRN for fever

## 2017-06-19 NOTE — DISCHARGE NOTE ADULT - ADDITIONAL INSTRUCTIONS
pt is to f/u with SAMMIE Card) within a week regarding blood sugar control  f/u with Dr. Jalen rangel CT from this admit and compare with pervious study pt is to f/u with SAMMIE Card) within a week regarding blood sugar control  f/u with Dr. Jalen rangel CT from this admit and compare with previous study pt is to f/u with SAMMIE Perea) within a week regarding blood sugar control  f/u with Dr. Jalen rangel CT from this admit and compare with previous study

## 2017-06-20 LAB
4/8 RATIO: 0.8 RATIO — LOW (ref 0.9–3.6)
ABS CD8: 1039 /UL — HIGH (ref 136–757)
CD3 BLASTS SPEC-ACNC: 1855 /UL — SIGNIFICANT CHANGE UP (ref 799–2171)
CD3 BLASTS SPEC-ACNC: 80 % — SIGNIFICANT CHANGE UP (ref 59–85)
CD4 %: 36 % — SIGNIFICANT CHANGE UP (ref 36–65)
CD8 %: 45 % — HIGH (ref 11–36)
CULTURE RESULTS: SIGNIFICANT CHANGE UP
LEGIONELLA AG UR QL: NEGATIVE — SIGNIFICANT CHANGE UP
S PNEUM AG SER QL: SIGNIFICANT CHANGE UP
SPECIMEN SOURCE: SIGNIFICANT CHANGE UP
T-CELL CD4 SUBSET PNL BLD: 833 /UL — SIGNIFICANT CHANGE UP (ref 489–1457)

## 2017-06-22 DIAGNOSIS — Z85.51 PERSONAL HISTORY OF MALIGNANT NEOPLASM OF BLADDER: ICD-10-CM

## 2017-06-22 DIAGNOSIS — Z21 ASYMPTOMATIC HUMAN IMMUNODEFICIENCY VIRUS [HIV] INFECTION STATUS: ICD-10-CM

## 2017-06-22 DIAGNOSIS — E11.9 TYPE 2 DIABETES MELLITUS WITHOUT COMPLICATIONS: ICD-10-CM

## 2017-06-22 DIAGNOSIS — Z85.038 PERSONAL HISTORY OF OTHER MALIGNANT NEOPLASM OF LARGE INTESTINE: ICD-10-CM

## 2017-06-22 DIAGNOSIS — Z90.6 ACQUIRED ABSENCE OF OTHER PARTS OF URINARY TRACT: ICD-10-CM

## 2017-06-22 DIAGNOSIS — F17.200 NICOTINE DEPENDENCE, UNSPECIFIED, UNCOMPLICATED: ICD-10-CM

## 2017-06-22 DIAGNOSIS — Z91.14 PATIENT'S OTHER NONCOMPLIANCE WITH MEDICATION REGIMEN: ICD-10-CM

## 2017-06-22 DIAGNOSIS — J44.9 CHRONIC OBSTRUCTIVE PULMONARY DISEASE, UNSPECIFIED: ICD-10-CM

## 2017-06-22 DIAGNOSIS — Z85.528 PERSONAL HISTORY OF OTHER MALIGNANT NEOPLASM OF KIDNEY: ICD-10-CM

## 2017-06-22 DIAGNOSIS — Z90.5 ACQUIRED ABSENCE OF KIDNEY: ICD-10-CM

## 2017-06-22 DIAGNOSIS — J20.8 ACUTE BRONCHITIS DUE TO OTHER SPECIFIED ORGANISMS: ICD-10-CM

## 2017-06-22 DIAGNOSIS — E78.5 HYPERLIPIDEMIA, UNSPECIFIED: ICD-10-CM

## 2017-06-23 LAB
CULTURE RESULTS: SIGNIFICANT CHANGE UP
CULTURE RESULTS: SIGNIFICANT CHANGE UP
SPECIMEN SOURCE: SIGNIFICANT CHANGE UP
SPECIMEN SOURCE: SIGNIFICANT CHANGE UP

## 2017-07-06 RX ORDER — ATORVASTATIN CALCIUM 80 MG/1
0 TABLET, FILM COATED ORAL
Qty: 0 | Refills: 0 | COMMUNITY

## 2017-07-06 RX ORDER — CIPROFLOXACIN LACTATE 400MG/40ML
1 VIAL (ML) INTRAVENOUS
Qty: 0 | Refills: 0 | COMMUNITY

## 2018-01-01 ENCOUNTER — EMERGENCY (EMERGENCY)
Facility: HOSPITAL | Age: 58
LOS: 1 days | Discharge: HOME | End: 2018-01-01
Attending: EMERGENCY MEDICINE | Admitting: EMERGENCY MEDICINE
Payer: MEDICARE

## 2018-01-01 VITALS
RESPIRATION RATE: 18 BRPM | HEIGHT: 66 IN | DIASTOLIC BLOOD PRESSURE: 71 MMHG | SYSTOLIC BLOOD PRESSURE: 111 MMHG | HEART RATE: 102 BPM | OXYGEN SATURATION: 99 % | WEIGHT: 173.94 LBS | TEMPERATURE: 99 F

## 2018-01-01 DIAGNOSIS — Z98.890 OTHER SPECIFIED POSTPROCEDURAL STATES: Chronic | ICD-10-CM

## 2018-01-01 DIAGNOSIS — Z90.49 ACQUIRED ABSENCE OF OTHER SPECIFIED PARTS OF DIGESTIVE TRACT: Chronic | ICD-10-CM

## 2018-01-01 DIAGNOSIS — S68.119A COMPLETE TRAUMATIC METACARPOPHALANGEAL AMPUTATION OF UNSPECIFIED FINGER, INITIAL ENCOUNTER: Chronic | ICD-10-CM

## 2018-01-01 DIAGNOSIS — Z90.6 ACQUIRED ABSENCE OF OTHER PARTS OF URINARY TRACT: Chronic | ICD-10-CM

## 2018-01-01 DIAGNOSIS — Z90.5 ACQUIRED ABSENCE OF KIDNEY: Chronic | ICD-10-CM

## 2018-01-01 LAB
ALBUMIN SERPL ELPH-MCNC: 3.9 G/DL — SIGNIFICANT CHANGE UP (ref 3.3–5)
ALP SERPL-CCNC: 108 U/L — SIGNIFICANT CHANGE UP (ref 40–120)
ALT FLD-CCNC: 13 U/L — SIGNIFICANT CHANGE UP (ref 12–78)
ANION GAP SERPL CALC-SCNC: 11 MMOL/L — SIGNIFICANT CHANGE UP (ref 5–17)
ANISOCYTOSIS BLD QL: SLIGHT — SIGNIFICANT CHANGE UP
AST SERPL-CCNC: 51 U/L — HIGH (ref 15–37)
BASOPHILS NFR BLD AUTO: 1 % — SIGNIFICANT CHANGE UP (ref 0–2)
BILIRUB SERPL-MCNC: 0.7 MG/DL — SIGNIFICANT CHANGE UP (ref 0.2–1.2)
BUN SERPL-MCNC: 27 MG/DL — HIGH (ref 7–23)
CALCIUM SERPL-MCNC: 9.1 MG/DL — SIGNIFICANT CHANGE UP (ref 8.5–10.1)
CHLORIDE SERPL-SCNC: 110 MMOL/L — HIGH (ref 96–108)
CO2 SERPL-SCNC: 18 MMOL/L — LOW (ref 22–31)
CREAT SERPL-MCNC: 1.4 MG/DL — HIGH (ref 0.5–1.3)
GLUCOSE SERPL-MCNC: 149 MG/DL — HIGH (ref 70–99)
HCT VFR BLD CALC: 51.3 % — HIGH (ref 39–50)
HGB BLD-MCNC: 17.6 G/DL — HIGH (ref 13–17)
LACTATE SERPL-SCNC: 2.4 MMOL/L — HIGH (ref 0.7–2)
LIDOCAIN IGE QN: 79 U/L — SIGNIFICANT CHANGE UP (ref 73–393)
LYMPHOCYTES # BLD AUTO: 5 % — LOW (ref 13–44)
MACROCYTES BLD QL: SLIGHT — SIGNIFICANT CHANGE UP
MCHC RBC-ENTMCNC: 34.4 GM/DL — SIGNIFICANT CHANGE UP (ref 32–36)
MCHC RBC-ENTMCNC: 36.7 PG — HIGH (ref 27–34)
MCV RBC AUTO: 106.7 FL — HIGH (ref 80–100)
MONOCYTES NFR BLD AUTO: 6 % — SIGNIFICANT CHANGE UP (ref 1–9)
NEUTROPHILS NFR BLD AUTO: 83 % — HIGH (ref 43–77)
NEUTS BAND # BLD: 4 % — SIGNIFICANT CHANGE UP (ref 0–8)
OVALOCYTES BLD QL SMEAR: SLIGHT — SIGNIFICANT CHANGE UP
PLAT MORPH BLD: NORMAL — SIGNIFICANT CHANGE UP
PLATELET # BLD AUTO: 194 K/UL — SIGNIFICANT CHANGE UP (ref 150–400)
POIKILOCYTOSIS BLD QL AUTO: SLIGHT — SIGNIFICANT CHANGE UP
POTASSIUM SERPL-MCNC: 4.9 MMOL/L — SIGNIFICANT CHANGE UP (ref 3.5–5.3)
POTASSIUM SERPL-SCNC: 4.9 MMOL/L — SIGNIFICANT CHANGE UP (ref 3.5–5.3)
PROT SERPL-MCNC: 7.9 G/DL — SIGNIFICANT CHANGE UP (ref 6–8.3)
RAPID RVP RESULT: SIGNIFICANT CHANGE UP
RBC # BLD: 4.81 M/UL — SIGNIFICANT CHANGE UP (ref 4.2–5.8)
RBC # FLD: 13.4 % — SIGNIFICANT CHANGE UP (ref 10.3–14.5)
RBC BLD AUTO: SIGNIFICANT CHANGE UP
SODIUM SERPL-SCNC: 139 MMOL/L — SIGNIFICANT CHANGE UP (ref 135–145)
VARIANT LYMPHS # BLD: 1 % — SIGNIFICANT CHANGE UP (ref 0–6)
WBC # BLD: 8.3 K/UL — SIGNIFICANT CHANGE UP (ref 3.8–10.5)
WBC # FLD AUTO: 8.3 K/UL — SIGNIFICANT CHANGE UP (ref 3.8–10.5)

## 2018-01-01 PROCEDURE — 74019 RADEX ABDOMEN 2 VIEWS: CPT | Mod: 26

## 2018-01-01 PROCEDURE — 71046 X-RAY EXAM CHEST 2 VIEWS: CPT | Mod: 26

## 2018-01-01 PROCEDURE — 99285 EMERGENCY DEPT VISIT HI MDM: CPT

## 2018-01-01 RX ORDER — ATORVASTATIN CALCIUM 80 MG/1
1 TABLET, FILM COATED ORAL
Qty: 0 | Refills: 0 | COMMUNITY

## 2018-01-01 RX ORDER — SODIUM CHLORIDE 9 MG/ML
1000 INJECTION INTRAMUSCULAR; INTRAVENOUS; SUBCUTANEOUS
Qty: 0 | Refills: 0 | Status: COMPLETED | OUTPATIENT
Start: 2018-01-01 | End: 2018-01-01

## 2018-01-01 RX ORDER — ONDANSETRON 8 MG/1
4 TABLET, FILM COATED ORAL ONCE
Qty: 0 | Refills: 0 | Status: COMPLETED | OUTPATIENT
Start: 2018-01-01 | End: 2018-01-01

## 2018-01-01 RX ADMIN — SODIUM CHLORIDE 1000 MILLILITER(S): 9 INJECTION INTRAMUSCULAR; INTRAVENOUS; SUBCUTANEOUS at 22:46

## 2018-01-01 RX ADMIN — SODIUM CHLORIDE 1000 MILLILITER(S): 9 INJECTION INTRAMUSCULAR; INTRAVENOUS; SUBCUTANEOUS at 21:56

## 2018-01-01 RX ADMIN — SODIUM CHLORIDE 1000 MILLILITER(S): 9 INJECTION INTRAMUSCULAR; INTRAVENOUS; SUBCUTANEOUS at 23:46

## 2018-01-01 RX ADMIN — ONDANSETRON 4 MILLIGRAM(S): 8 TABLET, FILM COATED ORAL at 23:49

## 2018-01-01 NOTE — ED PROVIDER NOTE - MEDICAL DECISION MAKING DETAILS
viral n v d in pt with hiv multiple abd surgeries exposed to family with same sx hydrate ro obstn ro influenza

## 2018-01-01 NOTE — ED PROVIDER NOTE - CARE PLAN
Principal Discharge DX:	Nausea vomiting and diarrhea Principal Discharge DX:	Nausea vomiting and diarrhea  Secondary Diagnosis:	UTI (urinary tract infection)  Secondary Diagnosis:	Dehydration, moderate

## 2018-01-01 NOTE — ED PROVIDER NOTE - GASTROINTESTINAL, MLM
Abdomen soft, non-tender, no guarding. well healed midline scar of abd and no cvat inc bs, urostomy draining clear yellow urine

## 2018-01-01 NOTE — ED PROVIDER NOTE - ENMT, MLM
Airway patent, Nasal mucosa clear. Mouth with dry mucosa. Throat has no vesicles, no oropharyngeal exudates and uvula is midline. no thrush

## 2018-01-01 NOTE — ED PROVIDER NOTE - OBJECTIVE STATEMENT
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  he has hx of many cancers and surgeries including nephrectomy neobladder for bladder cancer ileostomy followed at [facility] his pmd is at the [facility] clinic in [place].  he has been [place] [facility] [place] all the family he lives with became ill over past few days with n v d.  he had same for past few days and is now feeling so nauseated with frequent diarrhea and chills so he came to er for evaluation

## 2018-01-01 NOTE — ED ADULT NURSE NOTE - OBJECTIVE STATEMENT
patient came in ED from home with c/o diarrhea X this morning. daughter states everyone in the house have stomach flu. pt added having body ache all day. pt noted with urostomy.

## 2018-01-01 NOTE — ED PROVIDER NOTE - MUSCULOSKELETAL, MLM
Spine appears normal, range of motion is not limited, no muscle or joint tenderness missing fingers 3/4/5 r hand

## 2018-01-02 VITALS
HEART RATE: 91 BPM | TEMPERATURE: 98 F | DIASTOLIC BLOOD PRESSURE: 67 MMHG | RESPIRATION RATE: 17 BRPM | SYSTOLIC BLOOD PRESSURE: 109 MMHG | OXYGEN SATURATION: 96 %

## 2018-01-02 PROBLEM — C67.9 MALIGNANT NEOPLASM OF BLADDER, UNSPECIFIED: Chronic | Status: ACTIVE | Noted: 2017-06-17

## 2018-01-02 PROBLEM — E11.9 TYPE 2 DIABETES MELLITUS WITHOUT COMPLICATIONS: Chronic | Status: ACTIVE | Noted: 2017-06-17

## 2018-01-02 PROBLEM — E78.00 PURE HYPERCHOLESTEROLEMIA, UNSPECIFIED: Chronic | Status: ACTIVE | Noted: 2017-06-17

## 2018-01-02 PROBLEM — C64.9 MALIGNANT NEOPLASM OF UNSPECIFIED KIDNEY, EXCEPT RENAL PELVIS: Chronic | Status: ACTIVE | Noted: 2017-06-17

## 2018-01-02 PROBLEM — C18.9 MALIGNANT NEOPLASM OF COLON, UNSPECIFIED: Chronic | Status: ACTIVE | Noted: 2017-06-17

## 2018-01-02 LAB
APPEARANCE UR: ABNORMAL
BACTERIA # UR AUTO: ABNORMAL
BASOPHILS # BLD AUTO: 0 K/UL — SIGNIFICANT CHANGE UP (ref 0–0.2)
BASOPHILS NFR BLD AUTO: 0.5 % — SIGNIFICANT CHANGE UP (ref 0–2)
BILIRUB UR-MCNC: NEGATIVE — SIGNIFICANT CHANGE UP
COLOR SPEC: YELLOW — SIGNIFICANT CHANGE UP
DIFF PNL FLD: ABNORMAL
EOSINOPHIL # BLD AUTO: 0 K/UL — SIGNIFICANT CHANGE UP (ref 0–0.5)
EOSINOPHIL NFR BLD AUTO: 0.1 % — SIGNIFICANT CHANGE UP (ref 0–6)
EPI CELLS # UR: SIGNIFICANT CHANGE UP
GLUCOSE UR QL: NEGATIVE — SIGNIFICANT CHANGE UP
HCT VFR BLD CALC: 46.3 % — SIGNIFICANT CHANGE UP (ref 39–50)
HGB BLD-MCNC: 15.5 G/DL — SIGNIFICANT CHANGE UP (ref 13–17)
KETONES UR-MCNC: NEGATIVE — SIGNIFICANT CHANGE UP
LACTATE SERPL-SCNC: 2.1 MMOL/L — HIGH (ref 0.7–2)
LEUKOCYTE ESTERASE UR-ACNC: ABNORMAL
LYMPHOCYTES # BLD AUTO: 0.6 K/UL — LOW (ref 1–3.3)
LYMPHOCYTES # BLD AUTO: 12.8 % — LOW (ref 13–44)
MCHC RBC-ENTMCNC: 33.5 GM/DL — SIGNIFICANT CHANGE UP (ref 32–36)
MCHC RBC-ENTMCNC: 35.8 PG — HIGH (ref 27–34)
MCV RBC AUTO: 107 FL — HIGH (ref 80–100)
MONOCYTES # BLD AUTO: 0.4 K/UL — SIGNIFICANT CHANGE UP (ref 0–0.9)
MONOCYTES NFR BLD AUTO: 7.6 % — SIGNIFICANT CHANGE UP (ref 1–9)
NEUTROPHILS # BLD AUTO: 3.8 K/UL — SIGNIFICANT CHANGE UP (ref 1.8–7.4)
NEUTROPHILS NFR BLD AUTO: 79 % — HIGH (ref 43–77)
NITRITE UR-MCNC: POSITIVE
PH UR: 8 — SIGNIFICANT CHANGE UP (ref 5–8)
PLATELET # BLD AUTO: 166 K/UL — SIGNIFICANT CHANGE UP (ref 150–400)
PROT UR-MCNC: 25 MG/DL
RBC # BLD: 4.33 M/UL — SIGNIFICANT CHANGE UP (ref 4.2–5.8)
RBC # FLD: 13.8 % — SIGNIFICANT CHANGE UP (ref 10.3–14.5)
RBC CASTS # UR COMP ASSIST: SIGNIFICANT CHANGE UP /HPF (ref 0–4)
SP GR SPEC: 1.01 — SIGNIFICANT CHANGE UP (ref 1.01–1.02)
UROBILINOGEN FLD QL: NEGATIVE — SIGNIFICANT CHANGE UP
WBC # BLD: 4.8 K/UL — SIGNIFICANT CHANGE UP (ref 3.8–10.5)
WBC # FLD AUTO: 4.8 K/UL — SIGNIFICANT CHANGE UP (ref 3.8–10.5)
WBC UR QL: SIGNIFICANT CHANGE UP

## 2018-01-02 PROCEDURE — 96365 THER/PROPH/DIAG IV INF INIT: CPT

## 2018-01-02 PROCEDURE — 96361 HYDRATE IV INFUSION ADD-ON: CPT

## 2018-01-02 PROCEDURE — 87798 DETECT AGENT NOS DNA AMP: CPT

## 2018-01-02 PROCEDURE — 74019 RADEX ABDOMEN 2 VIEWS: CPT

## 2018-01-02 PROCEDURE — 81001 URINALYSIS AUTO W/SCOPE: CPT

## 2018-01-02 PROCEDURE — 87086 URINE CULTURE/COLONY COUNT: CPT

## 2018-01-02 PROCEDURE — 87581 M.PNEUMON DNA AMP PROBE: CPT

## 2018-01-02 PROCEDURE — 96375 TX/PRO/DX INJ NEW DRUG ADDON: CPT

## 2018-01-02 PROCEDURE — 85027 COMPLETE CBC AUTOMATED: CPT

## 2018-01-02 PROCEDURE — 99284 EMERGENCY DEPT VISIT MOD MDM: CPT | Mod: 25

## 2018-01-02 PROCEDURE — 83690 ASSAY OF LIPASE: CPT

## 2018-01-02 PROCEDURE — 87040 BLOOD CULTURE FOR BACTERIA: CPT

## 2018-01-02 PROCEDURE — 80053 COMPREHEN METABOLIC PANEL: CPT

## 2018-01-02 PROCEDURE — 83605 ASSAY OF LACTIC ACID: CPT

## 2018-01-02 PROCEDURE — 36415 COLL VENOUS BLD VENIPUNCTURE: CPT

## 2018-01-02 PROCEDURE — 71046 X-RAY EXAM CHEST 2 VIEWS: CPT

## 2018-01-02 PROCEDURE — 87486 CHLMYD PNEUM DNA AMP PROBE: CPT

## 2018-01-02 PROCEDURE — 87633 RESP VIRUS 12-25 TARGETS: CPT

## 2018-01-02 RX ORDER — CEFTRIAXONE 500 MG/1
1 INJECTION, POWDER, FOR SOLUTION INTRAMUSCULAR; INTRAVENOUS ONCE
Qty: 0 | Refills: 0 | Status: COMPLETED | OUTPATIENT
Start: 2018-01-02 | End: 2018-01-02

## 2018-01-02 RX ORDER — CEFUROXIME AXETIL 250 MG
1 TABLET ORAL
Qty: 20 | Refills: 0 | OUTPATIENT
Start: 2018-01-02 | End: 2018-01-11

## 2018-01-02 RX ORDER — ONDANSETRON 8 MG/1
1 TABLET, FILM COATED ORAL
Qty: 30 | Refills: 0 | OUTPATIENT
Start: 2018-01-02

## 2018-01-02 RX ORDER — KETOROLAC TROMETHAMINE 30 MG/ML
15 SYRINGE (ML) INJECTION ONCE
Qty: 0 | Refills: 0 | Status: DISCONTINUED | OUTPATIENT
Start: 2018-01-02 | End: 2018-01-02

## 2018-01-02 RX ADMIN — Medication 15 MILLIGRAM(S): at 01:43

## 2018-01-02 RX ADMIN — CEFTRIAXONE 100 GRAM(S): 500 INJECTION, POWDER, FOR SOLUTION INTRAMUSCULAR; INTRAVENOUS at 01:42

## 2018-01-02 RX ADMIN — Medication 15 MILLIGRAM(S): at 01:55

## 2018-01-02 NOTE — ED ADULT NURSE REASSESSMENT NOTE - NS ED NURSE REASSESS COMMENT FT1
A+O x 4. 3rd NS Bolus. Zofran administered for nausea. wife at the bedside. 2nd lactate pending completion of 3rd NS Bolus.

## 2018-01-03 LAB
CULTURE RESULTS: SIGNIFICANT CHANGE UP
SPECIMEN SOURCE: SIGNIFICANT CHANGE UP

## 2021-08-30 NOTE — ED ADULT NURSE NOTE - NS ED NURSE LEVEL OF CONSCIOUSNESS SPEECH
Impression: Regular astigmatism, bilateral: H52.223. Plan: Discussed diagnosis with patient. Dispensed new Spec Rx today for full time wear. Patient educated on adapt to first time PAL. Speaking Coherently

## 2023-05-02 NOTE — ED ADULT TRIAGE NOTE - WEIGHT IN KG
77.1 Opioid Counseling: I discussed with the patient the potential side effects of opioids including but not limited to addiction, altered mental status, and depression. I stressed avoiding alcohol, benzodiazepines, muscle relaxants and sleep aids unless specifically okayed by a physician. The patient verbalized understanding of the proper use and possible adverse effects of opioids. All of the patient's questions and concerns were addressed. They were instructed to flush the remaining pills down the toilet if they did not need them for pain.

## 2024-05-07 NOTE — ED ADULT NURSE NOTE - NS ED NURSE REPORT GIVEN TO FT
05/07/24 1252 05/07/24 1629   Vital Signs   /78 (!) 148/76   Temp (!) 96.4 °F (35.8 °C) 97.2 °F (36.2 °C)   Pulse 82 78   Respirations 20 20       Treatment time: 3.5 hours  Net UF: 3.6 L    Pre weight: 105.4 kg (bed scale, stated not able to stand)  Post weight: 101.8 kg (bed scale, confused, attempting to get out of bed, redirected easily.)  EDW: 95 kg    Access used: [facility] HD Tunneled cath  Access function: No problems    Medications or blood products given: Retacrit 5000 units IVP.  Neosporin ointment to [facility] HD cath exit site.    Regular outpatient schedule: [facility]    Summary of response to treatment: Tolerated 3.5 hour tx with wide fluctuations in BP, SBP 's.  Neosporin ointment applied to [facility] HD exit site which is red/seeping serous fluid/inflamed.  Dr. Golden aware.  CHG/tegaderm drsg not used, sterile 2x2 gauge/paper tape used, encouraged not to scratch area.     Crit line: H1=26. H2=25.8, difference of .2, no refill present.  Ending profile A.    Copy of dialysis treatment record placed in chart, to be scanned into EMR.    Report called to Lauren Nuñez RN   Andrew ROSSI